# Patient Record
Sex: FEMALE | Race: WHITE | NOT HISPANIC OR LATINO | Employment: FULL TIME | URBAN - METROPOLITAN AREA
[De-identification: names, ages, dates, MRNs, and addresses within clinical notes are randomized per-mention and may not be internally consistent; named-entity substitution may affect disease eponyms.]

---

## 2017-03-06 ENCOUNTER — LAB REQUISITION (OUTPATIENT)
Dept: LAB | Facility: HOSPITAL | Age: 57
End: 2017-03-06
Payer: COMMERCIAL

## 2017-03-06 ENCOUNTER — ALLSCRIPTS OFFICE VISIT (OUTPATIENT)
Dept: OTHER | Facility: OTHER | Age: 57
End: 2017-03-06

## 2017-03-06 DIAGNOSIS — R31.29 OTHER MICROSCOPIC HEMATURIA: ICD-10-CM

## 2017-03-06 LAB
BACTERIA UR QL AUTO: NORMAL /HPF
BILIRUB UR QL STRIP: NEGATIVE
CLARITY UR: CLEAR
COLOR UR: YELLOW
GLUCOSE UR STRIP-MCNC: NEGATIVE MG/DL
HGB UR QL STRIP.AUTO: ABNORMAL
HYALINE CASTS #/AREA URNS LPF: NORMAL /LPF
KETONES UR STRIP-MCNC: NEGATIVE MG/DL
LEUKOCYTE ESTERASE UR QL STRIP: NEGATIVE
NITRITE UR QL STRIP: NEGATIVE
NON-SQ EPI CELLS URNS QL MICRO: NORMAL /HPF
PH UR STRIP.AUTO: 6 [PH] (ref 4.5–8)
PROT UR STRIP-MCNC: NEGATIVE MG/DL
RBC #/AREA URNS AUTO: NORMAL /HPF
SP GR UR STRIP.AUTO: 1.01 (ref 1–1.03)
UROBILINOGEN UR QL STRIP.AUTO: 0.2 E.U./DL
WBC #/AREA URNS AUTO: NORMAL /HPF

## 2017-03-06 PROCEDURE — 81001 URINALYSIS AUTO W/SCOPE: CPT | Performed by: FAMILY MEDICINE

## 2017-03-06 PROCEDURE — 87086 URINE CULTURE/COLONY COUNT: CPT | Performed by: FAMILY MEDICINE

## 2017-03-07 LAB — BACTERIA UR CULT: NORMAL

## 2017-04-01 ENCOUNTER — HOSPITAL ENCOUNTER (OUTPATIENT)
Dept: ULTRASOUND IMAGING | Facility: HOSPITAL | Age: 57
Discharge: HOME | End: 2017-04-01
Payer: COMMERCIAL

## 2017-04-01 DIAGNOSIS — R31.29 OTHER MICROSCOPIC HEMATURIA: ICD-10-CM

## 2017-04-01 PROCEDURE — 76770 US EXAM ABDO BACK WALL COMP: CPT

## 2017-07-17 ENCOUNTER — ALLSCRIPTS OFFICE VISIT (OUTPATIENT)
Dept: OTHER | Facility: OTHER | Age: 57
End: 2017-07-17

## 2018-01-13 VITALS
HEART RATE: 84 BPM | WEIGHT: 159.2 LBS | HEIGHT: 66 IN | DIASTOLIC BLOOD PRESSURE: 98 MMHG | BODY MASS INDEX: 25.58 KG/M2 | RESPIRATION RATE: 16 BRPM | SYSTOLIC BLOOD PRESSURE: 142 MMHG

## 2018-01-14 VITALS
HEIGHT: 65 IN | DIASTOLIC BLOOD PRESSURE: 82 MMHG | TEMPERATURE: 98.3 F | HEART RATE: 80 BPM | SYSTOLIC BLOOD PRESSURE: 140 MMHG | BODY MASS INDEX: 26.66 KG/M2 | RESPIRATION RATE: 16 BRPM | WEIGHT: 160 LBS

## 2018-02-15 ENCOUNTER — OFFICE VISIT (OUTPATIENT)
Dept: FAMILY MEDICINE CLINIC | Facility: CLINIC | Age: 58
End: 2018-02-15
Payer: COMMERCIAL

## 2018-02-15 VITALS
DIASTOLIC BLOOD PRESSURE: 94 MMHG | BODY MASS INDEX: 25.01 KG/M2 | HEART RATE: 92 BPM | HEIGHT: 66 IN | WEIGHT: 155.6 LBS | RESPIRATION RATE: 14 BRPM | TEMPERATURE: 99.8 F | SYSTOLIC BLOOD PRESSURE: 146 MMHG

## 2018-02-15 DIAGNOSIS — K63.5 POLYP OF COLON, UNSPECIFIED PART OF COLON, UNSPECIFIED TYPE: ICD-10-CM

## 2018-02-15 DIAGNOSIS — J02.0 STREP PHARYNGITIS: Primary | ICD-10-CM

## 2018-02-15 DIAGNOSIS — R10.32 LEFT LOWER QUADRANT PAIN: ICD-10-CM

## 2018-02-15 DIAGNOSIS — J02.9 SORE THROAT: ICD-10-CM

## 2018-02-15 DIAGNOSIS — R31.9 HEMATURIA, UNSPECIFIED TYPE: ICD-10-CM

## 2018-02-15 LAB
S PYO AG THROAT QL: POSITIVE
SL AMB  POCT GLUCOSE, UA: ABNORMAL
SL AMB LEUKOCYTE ESTERASE,UA: ABNORMAL
SL AMB POCT BILIRUBIN,UA: ABNORMAL
SL AMB POCT BLOOD,UA: ABNORMAL
SL AMB POCT CLARITY,UA: CLEAR
SL AMB POCT COLOR,UA: YELLOW
SL AMB POCT KETONES,UA: ABNORMAL
SL AMB POCT NITRITE,UA: ABNORMAL
SL AMB POCT PH,UA: 5
SL AMB POCT SPECIFIC GRAVITY,UA: 1.03
SL AMB POCT URINE PROTEIN: ABNORMAL
SL AMB POCT UROBILINOGEN: 0.2

## 2018-02-15 PROCEDURE — 81002 URINALYSIS NONAUTO W/O SCOPE: CPT | Performed by: PHYSICIAN ASSISTANT

## 2018-02-15 PROCEDURE — 87880 STREP A ASSAY W/OPTIC: CPT | Performed by: PHYSICIAN ASSISTANT

## 2018-02-15 PROCEDURE — 99214 OFFICE O/P EST MOD 30 MIN: CPT | Performed by: PHYSICIAN ASSISTANT

## 2018-02-15 RX ORDER — AMOXICILLIN 500 MG/1
500 TABLET, FILM COATED ORAL
Qty: 30 TABLET | Refills: 0 | Status: SHIPPED | OUTPATIENT
Start: 2018-02-15 | End: 2018-02-25

## 2018-04-09 ENCOUNTER — OFFICE VISIT (OUTPATIENT)
Dept: FAMILY MEDICINE CLINIC | Facility: CLINIC | Age: 58
End: 2018-04-09
Payer: COMMERCIAL

## 2018-04-09 VITALS
RESPIRATION RATE: 16 BRPM | TEMPERATURE: 98.3 F | HEIGHT: 66 IN | BODY MASS INDEX: 25.17 KG/M2 | WEIGHT: 156.6 LBS | HEART RATE: 82 BPM | DIASTOLIC BLOOD PRESSURE: 88 MMHG | SYSTOLIC BLOOD PRESSURE: 138 MMHG

## 2018-04-09 DIAGNOSIS — J02.9 SORE THROAT: ICD-10-CM

## 2018-04-09 DIAGNOSIS — J06.9 UPPER RESPIRATORY TRACT INFECTION, UNSPECIFIED TYPE: Primary | ICD-10-CM

## 2018-04-09 LAB — S PYO AG THROAT QL: NEGATIVE

## 2018-04-09 PROCEDURE — 99213 OFFICE O/P EST LOW 20 MIN: CPT | Performed by: FAMILY MEDICINE

## 2018-04-09 PROCEDURE — 87880 STREP A ASSAY W/OPTIC: CPT | Performed by: FAMILY MEDICINE

## 2018-04-09 NOTE — PROGRESS NOTES
Assessment/Plan:  1  Upper respiratory tract infection/ Sore throat  - rest, increase fluids, gargle with salt water  - take Tylenol 500 mg 2 tablets every 4-6 hours as needed  - sodium chloride (OCEAN) 0 65 % nasal spray; 2 sprays into each nostril 2 (two) times a day  Dispense: 15 mL; Refill: 0  - POCT rapid strep A negative  - Throat culture obtained and we will call with results  - f/u in the office if symptoms persist or worsen  Possible side effects of new medications were reviewed with the patient today  The treatment plan was reviewed with the patient  The patient understands and agrees with the treatment plan      Subjective:   Chief Complaint   Patient presents with    Sore Throat      Patient ID: Lind Sicard is a 65 y o  female who presents today with c/o nasal congestion, sore throat and non productive cough x 2 days  Sore Throat    This is a new problem  The current episode started in the past 7 days  There has been no fever  Associated symptoms include congestion and coughing  Pertinent negatives include no abdominal pain, diarrhea, ear discharge, ear pain, headaches, hoarse voice, plugged ear sensation, neck pain, shortness of breath, swollen glands, trouble swallowing or vomiting  She has tried cool liquids and gargles for the symptoms  Past Medical History:   Diagnosis Date    Anxiety     Hyperlipidemia     Hypertension     Microscopic hematuria     Subclinical hypothyroidism      History reviewed  No pertinent surgical history  Family History   Problem Relation Age of Onset    Arthritis Mother     Heart disease Father     Diabetes Son     Mental illness Neg Hx     Substance Abuse Neg Hx      Social History     Social History    Marital status: /Civil Union     Spouse name: N/A    Number of children: N/A    Years of education: N/A     Occupational History    Not on file       Social History Main Topics    Smoking status: Never Smoker    Smokeless tobacco: Never Used    Alcohol use No    Drug use: No    Sexual activity: Not on file     Other Topics Concern    Not on file     Social History Narrative    No narrative on file       Current Outpatient Prescriptions:     sodium chloride (OCEAN) 0 65 % nasal spray, 2 sprays into each nostril 2 (two) times a day, Disp: 15 mL, Rfl: 0    Review of Systems   Constitutional: Negative for chills, fatigue and fever  HENT: Positive for congestion, postnasal drip and sore throat  Negative for ear discharge, ear pain, hoarse voice, sinus pain and trouble swallowing  Respiratory: Positive for cough  Negative for shortness of breath and wheezing  Cardiovascular: Negative for chest pain, palpitations and leg swelling  Gastrointestinal: Negative for abdominal pain, diarrhea, nausea and vomiting  Musculoskeletal: Negative for neck pain  Skin: Negative for rash  Neurological: Negative for dizziness and headaches  Hematological: Negative for adenopathy  Objective:    Vitals:    04/09/18 1102   BP: 138/88   BP Location: Left arm   Patient Position: Sitting   Cuff Size: Adult   Pulse: 82   Resp: 16   Temp: 98 3 °F (36 8 °C)   Weight: 71 kg (156 lb 9 6 oz)   Height: 5' 5 5" (1 664 m)        Physical Exam   Constitutional: She is oriented to person, place, and time  She appears well-developed and well-nourished  No distress  HENT:   Head: Normocephalic and atraumatic  Right Ear: Tympanic membrane and ear canal normal    Left Ear: Tympanic membrane and ear canal normal    Nose: Mucosal edema and rhinorrhea present  Right sinus exhibits no maxillary sinus tenderness and no frontal sinus tenderness  Left sinus exhibits no maxillary sinus tenderness and no frontal sinus tenderness  Mouth/Throat: Uvula is midline  Posterior oropharyngeal erythema present  No oropharyngeal exudate  Neck: Neck supple  Cardiovascular: Normal rate, regular rhythm and normal heart sounds  No murmur heard    Pulmonary/Chest: Effort normal and breath sounds normal  No respiratory distress  She has no wheezes  She has no rhonchi  She has no rales  Abdominal: Soft  Musculoskeletal: She exhibits no edema  Lymphadenopathy:     She has no cervical adenopathy  Neurological: She is alert and oriented to person, place, and time  Psychiatric: She has a normal mood and affect

## 2018-07-26 ENCOUNTER — TELEPHONE (OUTPATIENT)
Dept: FAMILY MEDICINE CLINIC | Facility: CLINIC | Age: 58
End: 2018-07-26

## 2018-07-26 NOTE — TELEPHONE ENCOUNTER
Patient has an appt with a plastic surgeon in [address] [place] [id number] to check on a mole on her nose   Do you want to see the patient first?

## 2018-08-31 ENCOUNTER — OFFICE VISIT (OUTPATIENT)
Dept: FAMILY MEDICINE CLINIC | Facility: CLINIC | Age: 58
End: 2018-08-31
Payer: COMMERCIAL

## 2018-08-31 DIAGNOSIS — I10 ESSENTIAL HYPERTENSION: ICD-10-CM

## 2018-08-31 DIAGNOSIS — R07.9 CHEST PAIN, UNSPECIFIED TYPE: Primary | ICD-10-CM

## 2018-08-31 DIAGNOSIS — E78.5 HYPERLIPIDEMIA, UNSPECIFIED HYPERLIPIDEMIA TYPE: ICD-10-CM

## 2018-08-31 DIAGNOSIS — R07.89 CHEST TIGHTNESS: ICD-10-CM

## 2018-08-31 PROCEDURE — 99213 OFFICE O/P EST LOW 20 MIN: CPT | Performed by: FAMILY MEDICINE

## 2018-08-31 PROCEDURE — 3008F BODY MASS INDEX DOCD: CPT | Performed by: FAMILY MEDICINE

## 2018-08-31 PROCEDURE — 93000 ELECTROCARDIOGRAM COMPLETE: CPT | Performed by: FAMILY MEDICINE

## 2018-08-31 RX ORDER — LISINOPRIL 10 MG/1
10 TABLET ORAL DAILY
Qty: 30 TABLET | Refills: 3 | Status: SHIPPED | OUTPATIENT
Start: 2018-08-31 | End: 2018-10-26 | Stop reason: SINTOL

## 2018-09-03 VITALS
DIASTOLIC BLOOD PRESSURE: 98 MMHG | SYSTOLIC BLOOD PRESSURE: 168 MMHG | HEART RATE: 84 BPM | WEIGHT: 159.6 LBS | RESPIRATION RATE: 16 BRPM | HEIGHT: 66 IN | BODY MASS INDEX: 25.65 KG/M2

## 2018-09-04 NOTE — PROGRESS NOTES
Assessment/Plan:    1  Chest pain  - POCT ECG: NSR, no acute ischemia, no interval change  - will obtain exercise stress test  - Stress test only, exercise; Future    2  Essential hypertension  - start Lisinopril 10 mg daily, will obtain lab work and call with results, discussed low salt diet and regular exercise  - lisinopril (ZESTRIL) 10 mg tablet; Take 1 tablet (10 mg total) by mouth daily  Dispense: 30 tablet; Refill: 3  - Lipid Panel with Direct LDL reflex; Future  - Comprehensive metabolic panel; Future  - CBC and differential; Future  - TSH, 3rd generation with Free T4 reflex; Future  - Urinalysis with reflex to microscopic    Follow up in 1 month or sooner as needed  Possible side effects of new medications were reviewed with the patient today  The treatment plan was reviewed with the patient  The patient understands and agrees with the treatment plan      Subjective:   Chief Complaint   Patient presents with    Hypertension    Chest Pain      Patient ID: Aleshia Higginbotham is a 63 y o  female here today with c/o elevated BP and occasional episodes of chest tightness/ pressure for the past few weeks  Patient denies radiation of pain, associated SOB, palpitations, leg edema, orthopnea, paroxysmal nocturnal dyspnea, fever/chills, cough or mucus production, nausea/vomiting or diarrhea  Patient denies exertional chest pain or SOB, she work as a special , 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 and denies feeling stressed or increased anxiety  Hypertension   This is a new problem  The current episode started 1 to 4 weeks ago  Associated symptoms include chest pain  Pertinent negatives include no anxiety, blurred vision, headaches, neck pain, palpitations, peripheral edema, shortness of breath or sweats  Past treatments include nothing           Past Medical History:   Diagnosis Date    Anxiety     Hyperlipidemia     Hypertension     Microscopic hematuria     Subclinical hypothyroidism      Past Surgical History:   Procedure Laterality Date    CATARACT EXTRACTION Right      SECTION      TONSILLECTOMY      TOTAL ABDOMINAL HYSTERECTOMY  2010    w/left ovary removal  Last assessed: 16     Family History   Problem Relation Age of Onset    Arthritis Mother     Heart disease Father     Diabetes Father     Diabetes type I Son         Mellitus    Mental illness Neg Hx     Substance Abuse Neg Hx      Social History     Social History    Marital status: /Civil Union     Spouse name: N/A    Number of children: N/A    Years of education: N/A     Occupational History    Not on file  Social History Main Topics    Smoking status: Never Smoker    Smokeless tobacco: Never Used    Alcohol use No    Drug use: No    Sexual activity: Not on file     Other Topics Concern    Not on file     Social History Narrative    Always uses seatbelts    Caffeine use: 2 cups coffee/day    Has smoke detectors           Current Outpatient Prescriptions:     lisinopril (ZESTRIL) 10 mg tablet, Take 1 tablet (10 mg total) by mouth daily, Disp: 30 tablet, Rfl: 3    Review of Systems   Constitutional: Negative for chills, fatigue and fever  HENT: Negative for congestion, ear discharge, ear pain, postnasal drip, sinus pain, sore throat and trouble swallowing  Eyes: Negative for blurred vision and visual disturbance  Respiratory: Negative for cough, shortness of breath and wheezing  Cardiovascular: Positive for chest pain  Negative for palpitations and leg swelling  Gastrointestinal: Negative for abdominal pain, diarrhea, nausea and vomiting  Genitourinary: Negative for dysuria, frequency, hematuria and urgency  Musculoskeletal: Negative for arthralgias, myalgias and neck pain  Skin: Negative for rash  Neurological: Negative for dizziness, syncope, weakness, numbness and headaches  Hematological: Negative for adenopathy               Objective:    Vitals:    18 1451 18 1501   BP: (!) 176/102 168/98   BP Location: Left arm Left arm   Patient Position: Sitting Sitting   Cuff Size: Standard Standard   Pulse: 84    Resp: 16    Weight: 72 4 kg (159 lb 9 6 oz)    Height: 5' 5 5" (1 664 m)         Physical Exam   Constitutional: She is oriented to person, place, and time  She appears well-developed and well-nourished  No distress  Neck: Neck supple  No thyromegaly present  Cardiovascular: Normal rate, regular rhythm and normal heart sounds  No murmur heard  Pulmonary/Chest: Effort normal  No respiratory distress  She has no wheezes  She has no rhonchi  She has no rales  She exhibits no tenderness  Abdominal: Soft  She exhibits no distension and no mass  There is no tenderness  Musculoskeletal: She exhibits no edema  Lymphadenopathy:     She has no cervical adenopathy  Neurological: She is alert and oriented to person, place, and time  Psychiatric: She has a normal mood and affect   Her behavior is normal  Thought content normal

## 2018-10-02 ENCOUNTER — OFFICE VISIT (OUTPATIENT)
Dept: FAMILY MEDICINE CLINIC | Facility: CLINIC | Age: 58
End: 2018-10-02
Payer: COMMERCIAL

## 2018-10-02 VITALS
BODY MASS INDEX: 25.23 KG/M2 | RESPIRATION RATE: 16 BRPM | WEIGHT: 157 LBS | HEIGHT: 66 IN | DIASTOLIC BLOOD PRESSURE: 98 MMHG | HEART RATE: 80 BPM | SYSTOLIC BLOOD PRESSURE: 162 MMHG

## 2018-10-02 DIAGNOSIS — I10 ESSENTIAL HYPERTENSION: Primary | ICD-10-CM

## 2018-10-02 DIAGNOSIS — E78.2 HYPERLIPEMIA, MIXED: ICD-10-CM

## 2018-10-02 DIAGNOSIS — M25.571 RIGHT ANKLE PAIN, UNSPECIFIED CHRONICITY: ICD-10-CM

## 2018-10-02 DIAGNOSIS — R79.89 ELEVATED TSH: ICD-10-CM

## 2018-10-02 DIAGNOSIS — R94.39 ABNORMAL CARDIOVASCULAR STRESS TEST: ICD-10-CM

## 2018-10-02 PROCEDURE — 99214 OFFICE O/P EST MOD 30 MIN: CPT | Performed by: FAMILY MEDICINE

## 2018-10-02 NOTE — PATIENT INSTRUCTIONS

## 2018-10-09 ENCOUNTER — TELEPHONE (OUTPATIENT)
Dept: FAMILY MEDICINE CLINIC | Facility: CLINIC | Age: 58
End: 2018-10-09

## 2018-10-26 ENCOUNTER — OFFICE VISIT (OUTPATIENT)
Dept: FAMILY MEDICINE CLINIC | Facility: CLINIC | Age: 58
End: 2018-10-26
Payer: COMMERCIAL

## 2018-10-26 ENCOUNTER — TELEPHONE (OUTPATIENT)
Dept: FAMILY MEDICINE CLINIC | Facility: CLINIC | Age: 58
End: 2018-10-26

## 2018-10-26 VITALS
BODY MASS INDEX: 25.55 KG/M2 | WEIGHT: 159 LBS | SYSTOLIC BLOOD PRESSURE: 140 MMHG | HEIGHT: 66 IN | RESPIRATION RATE: 16 BRPM | TEMPERATURE: 97.9 F | HEART RATE: 76 BPM | DIASTOLIC BLOOD PRESSURE: 92 MMHG

## 2018-10-26 DIAGNOSIS — B37.83 ANGULAR CHEILITIS WITH CANDIDIASIS: ICD-10-CM

## 2018-10-26 DIAGNOSIS — H10.32 ACUTE CONJUNCTIVITIS OF LEFT EYE, UNSPECIFIED ACUTE CONJUNCTIVITIS TYPE: Primary | ICD-10-CM

## 2018-10-26 DIAGNOSIS — J06.9 UPPER RESPIRATORY TRACT INFECTION, UNSPECIFIED TYPE: ICD-10-CM

## 2018-10-26 PROCEDURE — 99213 OFFICE O/P EST LOW 20 MIN: CPT | Performed by: FAMILY MEDICINE

## 2018-10-26 PROCEDURE — 3008F BODY MASS INDEX DOCD: CPT | Performed by: FAMILY MEDICINE

## 2018-10-26 PROCEDURE — 1036F TOBACCO NON-USER: CPT | Performed by: FAMILY MEDICINE

## 2018-10-26 RX ORDER — TOBRAMYCIN 3 MG/ML
2 SOLUTION/ DROPS OPHTHALMIC 3 TIMES DAILY
Qty: 5 ML | Refills: 0 | Status: SHIPPED | OUTPATIENT
Start: 2018-10-26 | End: 2019-01-15 | Stop reason: ALTCHOICE

## 2018-10-26 NOTE — TELEPHONE ENCOUNTER
Patient is passing on the info nereyda needed   The cream she uses for under her lips is clotrimazole betamethasone dipropionate cream  She needs this sent to [facility] [place]

## 2018-10-26 NOTE — PROGRESS NOTES
Assessment/Plan:    1  Acute conjunctivitis of left eye, unspecified acute conjunctivitis type  - start Tobrex eye drops, advised to see an eye doctor if symptoms not better  - tobramycin (TOBREX) 0 3 % SOLN; Administer 2 drops into the left eye 3 (three) times a day  Dispense: 5 mL; Refill: 0    2  Upper respiratory tract infection, unspecified type  - discussed rest, plenty of fluids, use saline nasal rinse and take Mucinex as needed    3  Angular cheilitis with candidiasis  - nystatin (MYCOSTATIN) cream; Apply topically 2 (two) times a day to affected areas  Dispense: 30 g; Refill: 0  - follow up in the office if symptoms persist or worsen  Possible side effects of new medications were reviewed with the patient today  The treatment plan was reviewed with the patient  The patient understands and agrees with the treatment plan      Subjective:   Chief Complaint   Patient presents with    Cold Like Symptoms    Eye Pain     itchy L eye    Dry lips      Patient ID: Leopold Boom is a 63 y o  female here with c/o increased nasal congestion, runny nose for several days, she also developed left eye redness, itching and crusting, no eye pain or photophobia, no FB sensation, no fever or chills, no cough, no chest pain or SOB  Patient is also c/o redness and crusting at the corners of her mouth, she has been using chapstick without significant improvement             Past Medical History:   Diagnosis Date    Anxiety     Hyperlipidemia     Hypertension     Microscopic hematuria     Subclinical hypothyroidism      Past Surgical History:   Procedure Laterality Date    CATARACT EXTRACTION Right      SECTION      TONSILLECTOMY      TOTAL ABDOMINAL HYSTERECTOMY  2010    w/left ovary removal  Last assessed: 16     Family History   Problem Relation Age of Onset    Arthritis Mother     Heart disease Father     Diabetes Father     Diabetes type I Son         Mellitus    Mental illness Neg Hx     Substance Abuse Neg Hx      Social History     Social History    Marital status: /Civil Union     Spouse name: N/A    Number of children: N/A    Years of education: N/A     Occupational History    Not on file  Social History Main Topics    Smoking status: Never Smoker    Smokeless tobacco: Never Used    Alcohol use No    Drug use: No    Sexual activity: Not on file     Other Topics Concern    Not on file     Social History Narrative    Always uses seatbelts    Caffeine use: 2 cups coffee/day    Has smoke detectors           Current Outpatient Prescriptions:     tobramycin (TOBREX) 0 3 % SOLN, Administer 2 drops into the left eye 3 (three) times a day, Disp: 5 mL, Rfl: 0    Review of Systems   Constitutional: Negative for chills, fatigue and fever  HENT: Positive for congestion, postnasal drip and rhinorrhea  Negative for ear pain, sinus pressure, sore throat and trouble swallowing  Eyes: Positive for discharge, redness and itching  Negative for photophobia, pain and visual disturbance  Respiratory: Negative for cough, shortness of breath and wheezing  Cardiovascular: Negative for chest pain, palpitations and leg swelling  Gastrointestinal: Negative for abdominal pain, diarrhea, nausea and vomiting  Objective:    Vitals:    10/26/18 1055   BP: 140/92   BP Location: Left arm   Patient Position: Sitting   Cuff Size: Standard   Pulse: 76   Resp: 16   Temp: 97 9 °F (36 6 °C)   Weight: 72 1 kg (159 lb)   Height: 5' 5 5" (1 664 m)        Physical Exam   Constitutional: She appears well-developed and well-nourished  No distress  HENT:   Right Ear: Tympanic membrane and ear canal normal    Left Ear: Tympanic membrane and ear canal normal    Nose: Mucosal edema present  No rhinorrhea  Mouth/Throat: Oropharynx is clear and moist    Eyes: Pupils are equal, round, and reactive to light  EOM and lids are normal  Right conjunctiva is not injected   Left conjunctiva is injected  Neck: Neck supple  Cardiovascular: Normal rate, regular rhythm and normal heart sounds  No murmur heard  Pulmonary/Chest: Effort normal and breath sounds normal  She has no wheezes  She has no rales  Lymphadenopathy:     She has no cervical adenopathy     Skin:   Erythema just below lower lip and corners of mouth

## 2018-10-26 NOTE — TELEPHONE ENCOUNTER
Please call patient, I do not recommend to use any steroid cream ( betamethasone) on her face  I can prescribe antifungal cream - Nystatin  If patient agrees, I will send Nystatin script to her pharmacy

## 2018-10-27 RX ORDER — NYSTATIN 100000 U/G
CREAM TOPICAL 2 TIMES DAILY
Qty: 30 G | Refills: 0 | Status: SHIPPED | OUTPATIENT
Start: 2018-10-27 | End: 2019-01-15 | Stop reason: ALTCHOICE

## 2018-10-27 NOTE — TELEPHONE ENCOUNTER
I informed patient that she should not use the steroid cream on her face  Yes, she would like you to prescribe Nystatin to her [facility]

## 2018-12-16 ENCOUNTER — OFFICE VISIT (OUTPATIENT)
Dept: URGENT CARE | Facility: CLINIC | Age: 58
End: 2018-12-16
Payer: COMMERCIAL

## 2018-12-16 DIAGNOSIS — J02.9 SORE THROAT: ICD-10-CM

## 2018-12-16 DIAGNOSIS — R09.82 POST-NASAL DRIP: Primary | ICD-10-CM

## 2018-12-16 LAB — S PYO AG THROAT QL: NEGATIVE

## 2018-12-16 PROCEDURE — 99213 OFFICE O/P EST LOW 20 MIN: CPT | Performed by: NURSE PRACTITIONER

## 2018-12-16 PROCEDURE — 87430 STREP A AG IA: CPT | Performed by: NURSE PRACTITIONER

## 2018-12-16 NOTE — PATIENT INSTRUCTIONS
Follow up with pcp   Take meds as directed  Use mucinex for the symptoms   Take zyrtec or allegra for symptoms daily   Use flonase as directed  Warm salt gargles  No product with a D or Dm component due to BP  Discussed elevated BP and to follow up with pcp     Warm fluids   Rest

## 2018-12-16 NOTE — PROGRESS NOTES
NAME: Muna Saleem is a 63 y o  female  : 1960    MRN: [id number]      Assessment and Plan   Post-nasal drip [R09 82]  1  Post-nasal drip  POCT rapid strepA   2  Sore throat  POCT rapid strepA    Throat culture       Bert Hammond was seen today for sore throat  Diagnoses and all orders for this visit:    Post-nasal drip  -     POCT rapid strepA    Sore throat  -     POCT rapid strepA  -     Throat culture    rapid strep was negative today on exam, sent for culture    Patient Instructions   Patient Instructions   Follow up with pcp   Take meds as directed  Use mucinex for the symptoms   Take zyrtec or allegra for symptoms daily   Use flonase as directed  Warm salt gargles  No product with a D or Dm component due to BP  Discussed elevated BP and to follow up with pcp     Warm fluids   Rest     Proceed to ER if symptoms worsen  Chief Complaint     Chief Complaint   Patient presents with    Sore Throat     swallen tonsils and sore throat past 3 weeks         History of Present Illness     Pt here today for sore throat, swollen glands, states leaving this weekend to [facility] and wants to be better before she leaves  She has had the symptoms for the past three weeks and then went away and then came back  Rapid strept done today and negative  She had the flu shot in Oct 2018  She has pain on the left side of the neck  She has not taken anything or doing anything for the symptoms  She has no pain with ROM of the neck and no palpable swelling noted  She also has elevated BP today and is aware that it is elevated at times  She was started on lisinopril in October and then took herself off  Her pcp is aware and her BP at home is never this high  She has no CP or SOB noted today  She has no JVD and has no deformity of her neck noted  NO difficulty swallowing or breathing present  Denies taking OTC meds at this time  Aware that her BP is still elevated and needs to see pcp about it   Pain or symptoms worsen, pt to go to the ER        Sore Throat    Pertinent negatives include no coughing or headaches  Review of Systems   Review of Systems   Constitutional: Negative for fatigue and fever  HENT: Positive for sore throat  Respiratory: Negative for cough and chest tightness  Genitourinary: Negative  Neurological: Negative for dizziness, syncope, weakness, light-headedness and headaches  Current Medications       Current Outpatient Prescriptions:     nystatin (MYCOSTATIN) cream, Apply topically 2 (two) times a day to affected areas (Patient not taking: Reported on 2018 ), Disp: 30 g, Rfl: 0    tobramycin (TOBREX) 0 3 % SOLN, Administer 2 drops into the left eye 3 (three) times a day (Patient not taking: Reported on 2018 ), Disp: 5 mL, Rfl: 0    Current Allergies     Allergies as of 2018 - Reviewed 2018   Allergen Reaction Noted    Zithromax [azithromycin]  2012    Codeine Drowsiness 2017              Past Medical History:   Diagnosis Date    Anxiety     Hyperlipidemia     Hypertension     Microscopic hematuria     Subclinical hypothyroidism        Past Surgical History:   Procedure Laterality Date    CATARACT EXTRACTION Right      SECTION      TONSILLECTOMY      TOTAL ABDOMINAL HYSTERECTOMY  2010    w/left ovary removal  Last assessed: 16       Family History   Problem Relation Age of Onset    Arthritis Mother     Heart disease Father     Diabetes Father     Diabetes type I Son         Mellitus    Mental illness Neg Hx     Substance Abuse Neg Hx          Medications have been verified      The following portions of the patient's history were reviewed and updated as appropriate: allergies, current medications, past family history, past medical history, past social history, past surgical history and problem list     Objective   BP (!) 196/102   Pulse 97   Temp (!) 96 3 °F (35 7 °C)   Ht 5' 4" (1 626 m)   Wt 71 7 kg (158 lb) SpO2 99%   BMI 27 12 kg/m²   Recheck 174/92     Physical Exam     Physical Exam   Constitutional: She appears well-developed  She is cooperative  Aware of elevated BP, rechecked BP and was 174/94   HENT:   Head: Normocephalic  Right Ear: Hearing, tympanic membrane, external ear and ear canal normal    Left Ear: Hearing, tympanic membrane, external ear and ear canal normal    Nose: Nose normal    Mouth/Throat: Uvula is midline and oropharynx is clear and moist    Back of throat is slight red, no white exudate noted, tonsils small in appearance   Neck: Trachea normal and normal range of motion  Neck supple  No JVD present  Muscular tenderness (left side) present  Carotid bruit is not present  No edema present  No thyroid mass present  Tender on the left side, no mass or enlarge lymphnodes noted  Cardiovascular: Normal rate and regular rhythm  Pulmonary/Chest: Effort normal and breath sounds normal  She has no decreased breath sounds  Neurological: She is alert  Skin: Skin is warm and intact         MATTY Martinez

## 2018-12-17 VITALS
HEIGHT: 64 IN | TEMPERATURE: 96.3 F | WEIGHT: 158 LBS | HEART RATE: 97 BPM | DIASTOLIC BLOOD PRESSURE: 102 MMHG | SYSTOLIC BLOOD PRESSURE: 196 MMHG | BODY MASS INDEX: 26.98 KG/M2 | OXYGEN SATURATION: 99 %

## 2019-01-15 ENCOUNTER — OFFICE VISIT (OUTPATIENT)
Dept: FAMILY MEDICINE CLINIC | Facility: CLINIC | Age: 59
End: 2019-01-15
Payer: COMMERCIAL

## 2019-01-15 VITALS
BODY MASS INDEX: 26.29 KG/M2 | HEIGHT: 65 IN | WEIGHT: 157.8 LBS | DIASTOLIC BLOOD PRESSURE: 108 MMHG | RESPIRATION RATE: 16 BRPM | SYSTOLIC BLOOD PRESSURE: 172 MMHG | HEART RATE: 84 BPM

## 2019-01-15 DIAGNOSIS — I10 ESSENTIAL HYPERTENSION: Primary | ICD-10-CM

## 2019-01-15 DIAGNOSIS — E78.5 HYPERLIPIDEMIA, UNSPECIFIED HYPERLIPIDEMIA TYPE: ICD-10-CM

## 2019-01-15 DIAGNOSIS — R79.89 ELEVATED TSH: ICD-10-CM

## 2019-01-15 PROCEDURE — 99213 OFFICE O/P EST LOW 20 MIN: CPT | Performed by: FAMILY MEDICINE

## 2019-01-15 PROCEDURE — 3008F BODY MASS INDEX DOCD: CPT | Performed by: FAMILY MEDICINE

## 2019-01-15 PROCEDURE — 1036F TOBACCO NON-USER: CPT | Performed by: FAMILY MEDICINE

## 2019-01-16 NOTE — PROGRESS NOTES
Assessment/Plan:  1  Essential hypertension  - patient declines BP lowering medications, discussed low sodiim diet, call the office in 2 weeks with home BP readings  - TSH, 3rd generation with Free T4 reflex; Future    2  Hyperlipidemia, unspecified hyperlipidemia type  - discussed low fat/ low cholesterol diet and regular exercise  - Lipid Panel with Direct LDL reflex; Future    3  Elevated TSH  - s/s of hypothyroidism discussed, will recheck TSH  - TSH, 3rd generation with Free T4 reflex; Future    Follow up in 3 months or sooner as needed  The treatment plan was reviewed with the patient  The patient understands and agrees with the treatment plan      Subjective:   Chief Complaint   Patient presents with    Hypertension    Hyperlipidemia    Elevated TSH      Patient ID: Imer Lerma is a 63 y o  female who presents for recheck on her HTN, patient states she feels well and has no complaints, her home BP readings have been ranging systolic in 670'H and diastolic in 36-21  Patient states her diet since the holidays has been unhealthy and she has been eating a lot of canned soups lately  Hypertension   The problem is uncontrolled  Pertinent negatives include no blurred vision, chest pain, headaches, malaise/fatigue, orthopnea, palpitations, peripheral edema, PND or shortness of breath  Past treatments include lifestyle changes         The following portions of the patient's history were reviewed and updated as appropriate: allergies, current medications, past family history, past medical history, past social history, past surgical history and problem list     Past Medical History:   Diagnosis Date    Anxiety     Hyperlipidemia     Hypertension     Microscopic hematuria     Subclinical hypothyroidism      Past Surgical History:   Procedure Laterality Date    CATARACT EXTRACTION Right      SECTION      TONSILLECTOMY      TOTAL ABDOMINAL HYSTERECTOMY  2010    w/left ovary removal  Last assessed: 5/24/16     Family History   Problem Relation Age of Onset    Arthritis Mother     Heart disease Father     Diabetes Father     Diabetes type I Son         Mellitus    Mental illness Neg Hx     Substance Abuse Neg Hx      Social History     Social History    Marital status: /Civil Union     Spouse name: N/A    Number of children: N/A    Years of education: N/A     Occupational History    Not on file  Social History Main Topics    Smoking status: Never Smoker    Smokeless tobacco: Never Used    Alcohol use No    Drug use: No    Sexual activity: Not on file     Other Topics Concern    Not on file     Social History Narrative    Always uses seatbelts    Caffeine use: 2 cups coffee/day    Has smoke detectors         No current outpatient prescriptions on file  Review of Systems   Constitutional: Negative for chills, fatigue, fever and malaise/fatigue  Eyes: Negative for blurred vision and visual disturbance  Respiratory: Negative for cough, shortness of breath and wheezing  Cardiovascular: Negative for chest pain, palpitations, orthopnea, leg swelling and PND  Gastrointestinal: Negative for abdominal pain, diarrhea, nausea and vomiting  Genitourinary: Negative for dysuria, frequency, hematuria and urgency  Neurological: Negative for dizziness, syncope, weakness, numbness and headaches  Hematological: Negative for adenopathy  Psychiatric/Behavioral: Negative for dysphoric mood and sleep disturbance  The patient is not nervous/anxious  Objective:    Vitals:    01/15/19 1808 01/15/19 1823   BP: (!) 178/118 (!) 172/108   BP Location: Left arm Left arm   Patient Position: Sitting Sitting   Cuff Size: Standard Standard   Pulse: 84    Resp: 16    Weight: 71 6 kg (157 lb 12 8 oz)    Height: 5' 5" (1 651 m)         Physical Exam   Constitutional: She is oriented to person, place, and time  She appears well-developed and well-nourished  No distress  Neck: Neck supple  No thyromegaly present  Cardiovascular: Normal rate, regular rhythm and normal heart sounds  No murmur heard  Pulmonary/Chest: Effort normal  No respiratory distress  She has no wheezes  She has no rhonchi  She has no rales  She exhibits no tenderness  Abdominal: Soft  She exhibits no distension and no mass  There is no tenderness  Musculoskeletal: She exhibits no edema  Lymphadenopathy:     She has no cervical adenopathy  Neurological: She is alert and oriented to person, place, and time  Psychiatric: She has a normal mood and affect   Her behavior is normal  Thought content normal

## 2019-10-21 ENCOUNTER — OFFICE VISIT (OUTPATIENT)
Dept: FAMILY MEDICINE CLINIC | Facility: CLINIC | Age: 59
End: 2019-10-21
Payer: COMMERCIAL

## 2019-10-21 VITALS
WEIGHT: 154.6 LBS | TEMPERATURE: 98.5 F | BODY MASS INDEX: 24.85 KG/M2 | RESPIRATION RATE: 16 BRPM | HEART RATE: 80 BPM | SYSTOLIC BLOOD PRESSURE: 162 MMHG | HEIGHT: 66 IN | DIASTOLIC BLOOD PRESSURE: 100 MMHG

## 2019-10-21 DIAGNOSIS — I10 ESSENTIAL HYPERTENSION: ICD-10-CM

## 2019-10-21 DIAGNOSIS — H10.33 ACUTE CONJUNCTIVITIS OF BOTH EYES, UNSPECIFIED ACUTE CONJUNCTIVITIS TYPE: ICD-10-CM

## 2019-10-21 DIAGNOSIS — R05.9 COUGH: ICD-10-CM

## 2019-10-21 DIAGNOSIS — J01.90 ACUTE NON-RECURRENT SINUSITIS, UNSPECIFIED LOCATION: Primary | ICD-10-CM

## 2019-10-21 PROCEDURE — 3008F BODY MASS INDEX DOCD: CPT | Performed by: FAMILY MEDICINE

## 2019-10-21 PROCEDURE — 99214 OFFICE O/P EST MOD 30 MIN: CPT | Performed by: FAMILY MEDICINE

## 2019-10-21 PROCEDURE — 93000 ELECTROCARDIOGRAM COMPLETE: CPT | Performed by: FAMILY MEDICINE

## 2019-10-21 RX ORDER — AMLODIPINE BESYLATE 2.5 MG/1
2.5 TABLET ORAL DAILY
Qty: 30 TABLET | Refills: 1 | Status: SHIPPED | OUTPATIENT
Start: 2019-10-21 | End: 2019-11-12

## 2019-10-21 RX ORDER — TOBRAMYCIN 3 MG/ML
2 SOLUTION/ DROPS OPHTHALMIC 3 TIMES DAILY
Qty: 5 ML | Refills: 0 | Status: SHIPPED | OUTPATIENT
Start: 2019-10-21 | End: 2019-11-12 | Stop reason: ALTCHOICE

## 2019-10-21 RX ORDER — AMOXICILLIN 500 MG/1
500 TABLET, FILM COATED ORAL
Qty: 30 TABLET | Refills: 0 | Status: SHIPPED | OUTPATIENT
Start: 2019-10-21 | End: 2019-10-31

## 2019-10-21 RX ORDER — PREDNISONE 10 MG/1
TABLET ORAL
Qty: 20 TABLET | Refills: 0 | Status: SHIPPED | OUTPATIENT
Start: 2019-10-21 | End: 2019-11-12 | Stop reason: ALTCHOICE

## 2019-10-21 NOTE — PROGRESS NOTES
Assessment/Plan:    1  Acute sinusitis  - start Amoxicillin and Prednisone taper, use saline nasal spray  - discussed voice rest and plenty of fluids  - follow up if not better  - amoxicillin (AMOXIL) 500 MG tablet; Take 1 tablet (500 mg total) by mouth 3 (three) times a day with meals for 10 days  Dispense: 30 tablet; Refill: 0  - predniSONE 10 mg tablet; Take 4 tablets daily with food for 2 days, 3 tablets daily for 2 days, 2 tablets daily for 2 days, 1 tablet daily for 2 days  Dispense: 20 tablet; Refill: 0    2  Acute conjunctivitis of both eyes  - start Tobrex eye drops, discussed eye hygiene  - follow up with your eye doctor if not better  - tobramycin (TOBREX) 0 3 % SOLN; Administer 2 drops to both eyes 3 (three) times a day  Dispense: 5 mL; Refill: 0    3  Essential hypertension  - start Amlodipine 2 5 mg daily  - reviewed low sodium diet and regular exercise  - POCT ECG  - amLODIPine (NORVASC) 2 5 mg tablet; Take 1 tablet (2 5 mg total) by mouth daily  Dispense: 30 tablet; Refill: 1    Follow up in 2-3 weeks or sooner as needed  Possible side effects of new medications were reviewed with the patient today  The treatment plan was reviewed with the patient  The patient understands and agrees with the treatment plan        Subjective:   Chief Complaint   Patient presents with    Sore Throat    Cough    Hypertension      Patient ID: Jagjit Rubalcava is a 63 y o  female here today with c/o nasal congestion, runny nose, cough and postnasal drip for over 3 weeks, she went to Urgent care center on 09/25/19m had a rapid strep test done which was negative, she was advised viral illness and take OTC cold medications as needed  Patient reports no significant improvement of her symptoms, she is now having eye discharge and crusting, yellow  Green rhinorrhea and losing her voice with prolonged talking at work  No fever or chills, no chest pain, no SOB         The following portions of the patient's history were reviewed and updated as appropriate: allergies, current medications, past family history, past medical history, past social history, past surgical history and problem list     Past Medical History:   Diagnosis Date    Anxiety     Hyperlipidemia     Hypertension     Microscopic hematuria     Subclinical hypothyroidism      Past Surgical History:   Procedure Laterality Date    CATARACT EXTRACTION Right      SECTION      TONSILLECTOMY  1980    TOTAL ABDOMINAL HYSTERECTOMY  2010    w/left ovary removal  Last assessed: 16     Family History   Problem Relation Age of Onset    Arthritis Mother     Heart disease Father     Diabetes Father     Diabetes type I Son         Mellitus    Mental illness Neg Hx     Substance Abuse Neg Hx      Social History     Socioeconomic History    Marital status: /Civil Union     Spouse name: Not on file    Number of children: Not on file    Years of education: Not on file    Highest education level: Not on file   Occupational History    Not on file   Social Needs    Financial resource strain: Not on file    Food insecurity:     Worry: Not on file     Inability: Not on file    Transportation needs:     Medical: Not on file     Non-medical: Not on file   Tobacco Use    Smoking status: Never Smoker    Smokeless tobacco: Never Used   Substance and Sexual Activity    Alcohol use: No    Drug use: No    Sexual activity: Not on file   Lifestyle    Physical activity:     Days per week: Not on file     Minutes per session: Not on file    Stress: Not on file   Relationships    Social connections:     Talks on phone: Not on file     Gets together: Not on file     Attends Evangelical service: Not on file     Active member of club or organization: Not on file     Attends meetings of clubs or organizations: Not on file     Relationship status: Not on file    Intimate partner violence:     Fear of current or ex partner: Not on file     Emotionally abused: Not on file     Physically abused: Not on file     Forced sexual activity: Not on file   Other Topics Concern    Not on file   Social History Narrative    Always uses seatbelts    Caffeine use: 2 cups coffee/day    Has smoke detectors       Current Outpatient Medications:     amLODIPine (NORVASC) 2 5 mg tablet, Take 1 tablet (2 5 mg total) by mouth daily, Disp: 30 tablet, Rfl: 1    amoxicillin (AMOXIL) 500 MG tablet, Take 1 tablet (500 mg total) by mouth 3 (three) times a day with meals for 10 days, Disp: 30 tablet, Rfl: 0    predniSONE 10 mg tablet, Take 4 tablets daily with food for 2 days, 3 tablets daily for 2 days, 2 tablets daily for 2 days, 1 tablet daily for 2 days, Disp: 20 tablet, Rfl: 0    tobramycin (TOBREX) 0 3 % SOLN, Administer 2 drops to both eyes 3 (three) times a day, Disp: 5 mL, Rfl: 0    Review of Systems   Constitutional: Positive for fatigue  Negative for chills and fever  HENT: Positive for congestion, postnasal drip, rhinorrhea, sinus pressure and voice change  Negative for ear pain, sore throat and trouble swallowing  Eyes: Positive for discharge, redness and itching  Negative for photophobia and visual disturbance  Respiratory: Positive for cough  Negative for shortness of breath and wheezing  Cardiovascular: Negative for chest pain and palpitations  Gastrointestinal: Negative for abdominal pain, diarrhea, nausea and vomiting  Genitourinary: Negative for dysuria and urgency  Neurological: Negative for dizziness and headaches  Hematological: Negative for adenopathy           Objective:    Vitals:    10/21/19 0806   BP: 162/100   BP Location: Left arm   Patient Position: Sitting   Cuff Size: Standard   Pulse: 80   Resp: 16   Temp: 98 5 °F (36 9 °C)   Weight: 70 1 kg (154 lb 9 6 oz)   Height: 5' 5 5" (1 664 m)     BP Readings from Last 3 Encounters:   10/21/19 162/100   01/15/19 (!) 172/108   12/16/18 (!) 196/102      Physical Exam   Constitutional: She is oriented to person, place, and time  She appears well-developed and well-nourished  No distress  HENT:   Head: Normocephalic and atraumatic  Right Ear: Tympanic membrane and ear canal normal    Left Ear: Tympanic membrane and ear canal normal    Nose: Mucosal edema present  Mouth/Throat: No oropharyngeal exudate or posterior oropharyngeal erythema  Eyes: Pupils are equal, round, and reactive to light  EOM are normal  Right conjunctiva is injected  Left conjunctiva is injected  Neck: Neck supple  Cardiovascular: Normal rate, regular rhythm and normal heart sounds  No murmur heard  Pulmonary/Chest: Effort normal and breath sounds normal  No respiratory distress  She has no wheezes  She has no rhonchi  She has no rales  Lymphadenopathy:     She has no cervical adenopathy  Neurological: She is alert and oriented to person, place, and time  BMI Counseling: Body mass index is 25 34 kg/m²  The BMI is above normal  Nutrition recommendations include consuming healthier snacks, moderation in carbohydrate intake and reducing intake of saturated fat and trans fat  Exercise recommendations include exercising 3-5 times per week

## 2019-11-12 ENCOUNTER — OFFICE VISIT (OUTPATIENT)
Dept: FAMILY MEDICINE CLINIC | Facility: CLINIC | Age: 59
End: 2019-11-12
Payer: COMMERCIAL

## 2019-11-12 VITALS
BODY MASS INDEX: 24.78 KG/M2 | HEART RATE: 80 BPM | RESPIRATION RATE: 16 BRPM | DIASTOLIC BLOOD PRESSURE: 88 MMHG | SYSTOLIC BLOOD PRESSURE: 142 MMHG | HEIGHT: 66 IN | WEIGHT: 154.2 LBS

## 2019-11-12 DIAGNOSIS — Z11.59 NEED FOR HEPATITIS C SCREENING TEST: ICD-10-CM

## 2019-11-12 DIAGNOSIS — I10 ESSENTIAL HYPERTENSION: Primary | ICD-10-CM

## 2019-11-12 DIAGNOSIS — Z23 NEED FOR PROPHYLACTIC VACCINATION AND INOCULATION AGAINST INFLUENZA: ICD-10-CM

## 2019-11-12 DIAGNOSIS — R79.89 ELEVATED TSH: ICD-10-CM

## 2019-11-12 DIAGNOSIS — E78.5 HYPERLIPIDEMIA, UNSPECIFIED HYPERLIPIDEMIA TYPE: ICD-10-CM

## 2019-11-12 PROCEDURE — 99213 OFFICE O/P EST LOW 20 MIN: CPT | Performed by: FAMILY MEDICINE

## 2019-11-12 PROCEDURE — 90682 RIV4 VACC RECOMBINANT DNA IM: CPT

## 2019-11-12 PROCEDURE — 90471 IMMUNIZATION ADMIN: CPT

## 2019-11-12 PROCEDURE — 1036F TOBACCO NON-USER: CPT | Performed by: FAMILY MEDICINE

## 2019-11-12 NOTE — PROGRESS NOTES
Assessment/Plan:  1  Essential hypertension  - patient declines trial of another BP lowering medication, discussed low sodiim diet and regular exercise, advised patient to continue home BP monitoring and bring BP log to the next visit  - Comprehensive metabolic panel; Future  - CBC and differential; Future  - UA (URINE) with reflex to Scope; Future    2  Hyperlipidemia, unspecified hyperlipidemia type  - discussed low fat/ low cholesterol diet and regular exercise  - Lipid Panel with Direct LDL reflex; Future    3  Elevated TSH  - s/s of hypothyroidism discussed, will recheck TSH  - TSH, 3rd generation with Free T4 reflex; Future    4  Need for prophylactic vaccination and inoculation against influenza  - influenza vaccine, [id number], quadrivalent, recombinant, PF, 0 5 mL, for patients 18 yr+ (FLUBLOK)    Follow up in 3 months with labs done prior  The treatment plan was reviewed with the patient  The patient understands and agrees with the treatment plan        Subjective:   Chief Complaint   Patient presents with    Hypertension      Patient ID: Gris Dixon is a 63 y o  female here for follow up visit for hypertension  She was prescribed Amlodipine 2 5 mg daily at her last visit which she took for a few days and developed lightheadedness and stopped taking it  Patient has been working on [employer] and start walking 3-4 days a week  Patient has no other complaints and reports home home BP readings in 130's- low 468'H systolic and in 79'Q diastolic  Patient denies chest pain, SOB, palpitations, leg edema, blurry vision, headache or further episodes of dizziness         The following portions of the patient's history were reviewed and updated as appropriate: allergies, current medications, past family history, past medical history, past social history, past surgical history and problem list     Past Medical History:   Diagnosis Date    Anxiety     Hyperlipidemia     Hypertension     Microscopic hematuria     Subclinical hypothyroidism      Past Surgical History:   Procedure Laterality Date    CATARACT EXTRACTION Right      SECTION  1991    TONSILLECTOMY  1980    TOTAL ABDOMINAL HYSTERECTOMY  2010    w/left ovary removal  Last assessed: 16     Family History   Problem Relation Age of Onset    Arthritis Mother     Heart disease Father     Diabetes Father     Diabetes type I Son         Mellitus    Mental illness Neg Hx     Substance Abuse Neg Hx      Social History     Socioeconomic History    Marital status: /Civil Union     Spouse name: Not on file    Number of children: Not on file    Years of education: Not on file    Highest education level: Not on file   Occupational History    Not on file   Social Needs    Financial resource strain: Not on file    Food insecurity:     Worry: Not on file     Inability: Not on file    Transportation needs:     Medical: Not on file     Non-medical: Not on file   Tobacco Use    Smoking status: Never Smoker    Smokeless tobacco: Never Used   Substance and Sexual Activity    Alcohol use: No    Drug use: No    Sexual activity: Not on file   Lifestyle    Physical activity:     Days per week: Not on file     Minutes per session: Not on file    Stress: Not on file   Relationships    Social connections:     Talks on phone: Not on file     Gets together: Not on file     Attends Mormon service: Not on file     Active member of club or organization: Not on file     Attends meetings of clubs or organizations: Not on file     Relationship status: Not on file    Intimate partner violence:     Fear of current or ex partner: Not on file     Emotionally abused: Not on file     Physically abused: Not on file     Forced sexual activity: Not on file   Other Topics Concern    Not on file   Social History Narrative    Always uses seatbelts    Caffeine use: 2 cups coffee/day    Has smoke detectors     No current outpatient medications on file  Review of Systems   Constitutional: Negative for chills, fatigue and fever  Eyes: Negative for visual disturbance  Respiratory: Negative for cough, shortness of breath and wheezing  Cardiovascular: Negative for chest pain, palpitations and leg swelling  Gastrointestinal: Negative for abdominal pain, diarrhea, nausea and vomiting  Genitourinary: Negative for dysuria, frequency, hematuria and urgency  Neurological: Negative for dizziness, syncope, weakness, numbness and headaches  Hematological: Negative for adenopathy  Psychiatric/Behavioral: Negative for dysphoric mood and sleep disturbance  The patient is not nervous/anxious  Objective:    Vitals:    11/12/19 1612   BP: 142/88   BP Location: Left arm   Patient Position: Sitting   Cuff Size: Standard   Pulse: 80   Resp: 16   Weight: 69 9 kg (154 lb 3 2 oz)   Height: 5' 5 5" (1 664 m)     BP Readings from Last 3 Encounters:   11/12/19 142/88   10/21/19 162/100   01/15/19 (!) 172/108     Wt Readings from Last 3 Encounters:   11/12/19 69 9 kg (154 lb 3 2 oz)   10/21/19 70 1 kg (154 lb 9 6 oz)   01/15/19 71 6 kg (157 lb 12 8 oz)      Physical Exam   Constitutional: She is oriented to person, place, and time  She appears well-developed and well-nourished  No distress  Neck: Neck supple  No thyromegaly present  Cardiovascular: Normal rate, regular rhythm and normal heart sounds  No murmur heard  Pulmonary/Chest: Effort normal  No respiratory distress  She has no wheezes  She has no rhonchi  She has no rales  She exhibits no tenderness  Abdominal: Soft  She exhibits no distension and no mass  There is no tenderness  Musculoskeletal: She exhibits no edema  Lymphadenopathy:     She has no cervical adenopathy  Neurological: She is alert and oriented to person, place, and time  Psychiatric: She has a normal mood and affect   Her behavior is normal  Thought content normal

## 2020-03-24 ENCOUNTER — TELEMEDICINE (OUTPATIENT)
Dept: FAMILY MEDICINE CLINIC | Facility: CLINIC | Age: 60
End: 2020-03-24
Payer: COMMERCIAL

## 2020-03-24 VITALS — SYSTOLIC BLOOD PRESSURE: 145 MMHG | DIASTOLIC BLOOD PRESSURE: 95 MMHG

## 2020-03-24 DIAGNOSIS — I10 ESSENTIAL HYPERTENSION: Primary | ICD-10-CM

## 2020-03-24 DIAGNOSIS — E03.9 ACQUIRED HYPOTHYROIDISM: ICD-10-CM

## 2020-03-24 DIAGNOSIS — E78.5 HYPERLIPIDEMIA, UNSPECIFIED HYPERLIPIDEMIA TYPE: ICD-10-CM

## 2020-03-24 PROCEDURE — 99213 OFFICE O/P EST LOW 20 MIN: CPT | Performed by: FAMILY MEDICINE

## 2020-03-24 RX ORDER — LEVOTHYROXINE SODIUM 0.03 MG/1
25 TABLET ORAL
Qty: 30 TABLET | Refills: 3 | Status: SHIPPED | OUTPATIENT
Start: 2020-03-24 | End: 2020-07-29 | Stop reason: SDUPTHER

## 2020-03-24 NOTE — PROGRESS NOTES
Virtual Regular Visit    Problem List Items Addressed This Visit     None      Visit Diagnoses     Essential hypertension    -  Primary    Hyperlipidemia, unspecified hyperlipidemia type        Acquired hypothyroidism        Relevant Medications    levothyroxine 25 mcg tablet        Reason for visit is for follow up hypertension, hyperlipidemia, elevated TSH and review lab work  Encounter provider Erin Allen MD    Provider located at [address] [place] [address] [address] [place] [address]  [phone]      Recent Visits  No visits were found meeting these conditions  Showing recent visits within past 7 days and meeting all other requirements     Future Appointments  No visits were found meeting these conditions  Showing future appointments within next 150 days and meeting all other requirements        After connecting through [employer], the patient was identified by name and date of birth  Saúl Cali was informed that this is a telemedicine visit and that the visit is being conducted through telephone due to technical difficulties which may not be secure and therefore, might not be HIPAA-compliant  My office door was closed  No one else was in the room  She acknowledged consent and understanding of privacy and security of the video platform  The patient has agreed to participate and understands they can discontinue the visit at any time  Subjective  Saúl Cali is a 63 y o  female reports feeling tired, no other complaints, no recent illnesses, no fever or chills, no recent travel  Patient reports home BP readings systolic ranging 913-082'H, occasional in low 611'V and diastolic in 80, occasionally in low 90's  Her BP this am was 148/92 and just now 135/80  Patient has been working on healthy low sodium diet, but has not been exercising regularly         Past Medical History:   Diagnosis Date    Anxiety  Hyperlipidemia     Hypertension     Microscopic hematuria     Subclinical hypothyroidism        Past Surgical History:   Procedure Laterality Date    CATARACT EXTRACTION Right      SECTION      TONSILLECTOMY      TOTAL ABDOMINAL HYSTERECTOMY  2010    w/left ovary removal  Last assessed: 16       Current Outpatient Medications   Medication Sig Dispense Refill    levothyroxine 25 mcg tablet Take 1 tablet (25 mcg total) by mouth daily before breakfast 30 tablet 3     No current facility-administered medications for this visit  Allergies   Allergen Reactions    Zithromax [Azithromycin]      Other reaction(s): GI Upset  Category: Allergy;     Clindamycin Dizziness and Fatigue    Codeine Drowsiness       Review of Systems   Constitutional: Positive for fatigue  Negative for activity change, appetite change, chills, fever and unexpected weight change  HENT: Negative for congestion, rhinorrhea and sore throat  Eyes: Negative for visual disturbance  Respiratory: Negative for cough, shortness of breath and wheezing  Cardiovascular: Negative for chest pain, palpitations and leg swelling  Gastrointestinal: Negative for abdominal pain, diarrhea, nausea and vomiting  Genitourinary: Negative for dysuria, frequency, hematuria and urgency  Neurological: Negative for dizziness, syncope, weakness, numbness and headaches  Hematological: Negative for adenopathy  Psychiatric/Behavioral: Negative for dysphoric mood and sleep disturbance  The patient is not nervous/anxious  Physical Exam   Constitutional: She is oriented to person, place, and time  No distress  Neurological: She is alert and oriented to person, place, and time  Psychiatric: She has a normal mood and affect  Lab results 2020:    TSH 8 8 up from 5 59 on 2018  Free T4 low at 0 9, was 0 94 on 2018   T Chol 265    HDL 49      Assessment/ plan:    1   Essential hypertension  - patient declines medications, reviewed low sodiim diet and regular exercise, advised to continue home BP monitoring and bring BP log to the next visit    2  Hyperlipidemia  - patient declines cholesterol lowering medications  - discussed low fat/ low cholesterol diet and regular exercise    3  Acquired hypothyroidism  - advised to start Levothyroxine, will recheck TFT's in 2-3 months  - levothyroxine 25 mcg tablet; Take 1 tablet (25 mcg total) by mouth daily before breakfast  Dispense: 30 tablet; Refill: 3  - TSH, 3rd generation with Free T4 reflex; Future    Follow up in the office in 3 months or sooner as needed  Patient understands and agrees with plan  I spent 15 minutes with the patient during this visit      [id number]

## 2020-07-24 ENCOUNTER — TELEPHONE (OUTPATIENT)
Dept: FAMILY MEDICINE CLINIC | Facility: CLINIC | Age: 60
End: 2020-07-24

## 2020-07-24 NOTE — TELEPHONE ENCOUNTER
Did you get the results of her thyroid test done yesterday?     She wants to confirm that you received this for her appointment in August [id number] cell  [phone]

## 2020-07-29 ENCOUNTER — TELEPHONE (OUTPATIENT)
Dept: FAMILY MEDICINE CLINIC | Facility: CLINIC | Age: 60
End: 2020-07-29

## 2020-07-29 DIAGNOSIS — E03.9 ACQUIRED HYPOTHYROIDISM: ICD-10-CM

## 2020-07-29 RX ORDER — LEVOTHYROXINE SODIUM 0.05 MG/1
25 TABLET ORAL
Qty: 30 TABLET | Refills: 3 | Status: SHIPPED | OUTPATIENT
Start: 2020-07-29 | End: 2020-08-19 | Stop reason: ALTCHOICE

## 2020-07-29 NOTE — TELEPHONE ENCOUNTER
I called pt to reschedule her Aug 5th appt  She is totally out of her levothyroxine and needs a refill until she can come in on Aug 19th  She did get her labs done (TSH), can you check them and see what dosage she needs and refill?

## 2020-07-29 NOTE — TELEPHONE ENCOUNTER
I recommend we increase her Levothyroxine dose to 50 mcg daily and send a new script to her pharmacy

## 2020-08-19 ENCOUNTER — OFFICE VISIT (OUTPATIENT)
Dept: FAMILY MEDICINE CLINIC | Facility: CLINIC | Age: 60
End: 2020-08-19
Payer: COMMERCIAL

## 2020-08-19 VITALS
RESPIRATION RATE: 16 BRPM | HEIGHT: 65 IN | BODY MASS INDEX: 26.87 KG/M2 | SYSTOLIC BLOOD PRESSURE: 162 MMHG | TEMPERATURE: 97.2 F | DIASTOLIC BLOOD PRESSURE: 98 MMHG | HEART RATE: 104 BPM | WEIGHT: 161.3 LBS

## 2020-08-19 DIAGNOSIS — Z13.220 SCREENING FOR CHOLESTEROL LEVEL: ICD-10-CM

## 2020-08-19 DIAGNOSIS — E03.9 ACQUIRED HYPOTHYROIDISM: ICD-10-CM

## 2020-08-19 DIAGNOSIS — E78.5 HYPERLIPIDEMIA, UNSPECIFIED HYPERLIPIDEMIA TYPE: ICD-10-CM

## 2020-08-19 DIAGNOSIS — I10 ESSENTIAL HYPERTENSION: Primary | ICD-10-CM

## 2020-08-19 PROCEDURE — 3080F DIAST BP >= 90 MM HG: CPT | Performed by: FAMILY MEDICINE

## 2020-08-19 PROCEDURE — 3077F SYST BP >= 140 MM HG: CPT | Performed by: FAMILY MEDICINE

## 2020-08-19 PROCEDURE — 99213 OFFICE O/P EST LOW 20 MIN: CPT | Performed by: FAMILY MEDICINE

## 2020-08-19 PROCEDURE — 3008F BODY MASS INDEX DOCD: CPT | Performed by: FAMILY MEDICINE

## 2020-08-19 PROCEDURE — 1036F TOBACCO NON-USER: CPT | Performed by: FAMILY MEDICINE

## 2020-08-19 NOTE — PROGRESS NOTES
Assessment/Plan:    1  Essential hypertension  - discussed Losartan or HCTZ, but patient declined, discussed life style changes and low sodium diet, continue home BP monitoring and bring BP log to the next visit    2  Hyperlipidemia  - Lipid Panel with Direct LDL reflex    3  Acquired hypothyroidism  - lab results 20 reviewed: TSH 5 04 with normal free T4 1 10  - s/s of hypothyroidism discussed, will recheck TFT's prior to next visit  - TSH, 3rd generation with Free T4 reflex    Follow up in 3-4 months or sooner as needed  The patient understands and agrees with the treatment plan  Subjective:   Chief Complaint   Patient presents with    Hypertension    Hyperlipidemia    Hypothyroidism      Patient ID: Warden Madera is a 63 y o  female who presents today for follow up visit for hypertension, hypothyroidism, hyperlipidemia  At her last visit on 20 she was started on Levothyroxine which she took for a few weeks and felt very tired and jittery and stopped taking it  She reports good home BP readings in 120-140's/ 80's  Patient was prescribed Lisinopril 10 mg daily in the past and stopped taking it due to cough, she was also prescribed Amlodipine 2 5 mg, but stopped taking it due to lightheadedness         The following portions of the patient's history were reviewed and updated as appropriate: allergies, current medications, past family history, past medical history, past social history, past surgical history and problem list     Past Medical History:   Diagnosis Date    Anxiety     Hyperlipidemia     Hypertension     Microscopic hematuria     Subclinical hypothyroidism      Past Surgical History:   Procedure Laterality Date    CATARACT EXTRACTION Right      SECTION      TONSILLECTOMY      TOTAL ABDOMINAL HYSTERECTOMY  2010    w/left ovary removal  Last assessed: 16     Family History   Problem Relation Age of Onset    Arthritis Mother     Heart disease Father     Diabetes Father     Diabetes type I Son         Mellitus    Mental illness Neg Hx     Substance Abuse Neg Hx     Alcohol abuse Neg Hx      Social History     Socioeconomic History    Marital status: /Civil Union     Spouse name: Not on file    Number of children: Not on file    Years of education: Not on file    Highest education level: Not on file   Occupational History    Not on file   Social Needs    Financial resource strain: Not on file    Food insecurity     Worry: Not on file     Inability: Not on file   [employer] needs     Medical: Not on file     Non-medical: Not on file   Tobacco Use    Smoking status: Never Smoker    Smokeless tobacco: Never Used   Substance and Sexual Activity    Alcohol use: No    Drug use: No    Sexual activity: Not on file   Lifestyle    Physical activity     Days per week: Not on file     Minutes per session: Not on file    Stress: Not on file   Relationships    Social connections     Talks on phone: Not on file     Gets together: Not on file     Attends Rastafari service: Not on file     Active member of club or organization: Not on file     Attends meetings of clubs or organizations: Not on file     Relationship status: Not on file    Intimate partner violence     Fear of current or ex partner: Not on file     Emotionally abused: Not on file     Physically abused: Not on file     Forced sexual activity: Not on file   Other Topics Concern    Not on file   Social History Narrative    Always uses seatbelts    Caffeine use: 2 cups coffee/day    Has smoke detectors     No current outpatient medications on file  Review of Systems   Constitutional: Negative for chills, fatigue and fever  Respiratory: Negative for cough, shortness of breath and wheezing  Cardiovascular: Negative for chest pain, palpitations and leg swelling  Gastrointestinal: Negative for abdominal pain, blood in stool, diarrhea, nausea and vomiting     Genitourinary: Negative for dysuria, frequency, hematuria and urgency  Neurological: Negative for dizziness, syncope, weakness, numbness and headaches  Hematological: Negative for adenopathy  Psychiatric/Behavioral: Negative for dysphoric mood and sleep disturbance  The patient is not nervous/anxious  Objective:    Vitals:    08/19/20 0806 08/19/20 0821   BP: (!) 180/102 162/98   BP Location: Left arm Left arm   Patient Position: Sitting Sitting   Cuff Size: Standard Standard   Pulse: 104    Resp: 16    Temp: (!) 97 2 °F (36 2 °C)    Weight: 73 2 kg (161 lb 4 8 oz)    Height: 5' 5 25" (1 657 m)      BP Readings from Last 3 Encounters:   08/19/20 162/98   03/24/20 145/95   11/12/19 142/88     Wt Readings from Last 3 Encounters:   08/19/20 73 2 kg (161 lb 4 8 oz)   11/12/19 69 9 kg (154 lb 3 2 oz)   10/21/19 70 1 kg (154 lb 9 6 oz)        Physical Exam  Constitutional:       General: She is not in acute distress  Appearance: She is well-developed  Neck:      Musculoskeletal: Neck supple  Thyroid: No thyromegaly  Cardiovascular:      Rate and Rhythm: Normal rate and regular rhythm  Heart sounds: Normal heart sounds  No murmur  Pulmonary:      Effort: Pulmonary effort is normal  No respiratory distress  Breath sounds: No wheezing, rhonchi or rales  Chest:      Chest wall: No tenderness  Abdominal:      General: There is no distension  Palpations: Abdomen is soft  There is no mass  Tenderness: There is no abdominal tenderness  Lymphadenopathy:      Cervical: No cervical adenopathy  Neurological:      Mental Status: She is alert and oriented to person, place, and time  Psychiatric:         Mood and Affect: Mood normal          Behavior: Behavior normal          Thought Content: Thought content normal             BMI Counseling: Body mass index is 26 64 kg/m²  The BMI is above normal  Nutrition recommendations include reducing portion sizes and consuming healthier snacks  Exercise recommendations include exercising 3-5 times per week

## 2020-12-03 ENCOUNTER — TELEPHONE (OUTPATIENT)
Dept: FAMILY MEDICINE CLINIC | Facility: CLINIC | Age: 60
End: 2020-12-03

## 2021-01-26 ENCOUNTER — OFFICE VISIT (OUTPATIENT)
Dept: FAMILY MEDICINE CLINIC | Facility: CLINIC | Age: 61
End: 2021-01-26
Payer: COMMERCIAL

## 2021-01-26 VITALS
HEART RATE: 98 BPM | SYSTOLIC BLOOD PRESSURE: 162 MMHG | DIASTOLIC BLOOD PRESSURE: 98 MMHG | RESPIRATION RATE: 16 BRPM | WEIGHT: 163 LBS | HEIGHT: 65 IN | BODY MASS INDEX: 27.16 KG/M2

## 2021-01-26 DIAGNOSIS — I10 ESSENTIAL HYPERTENSION: Primary | ICD-10-CM

## 2021-01-26 DIAGNOSIS — Z23 NEED FOR PROPHYLACTIC VACCINATION AND INOCULATION AGAINST INFLUENZA: ICD-10-CM

## 2021-01-26 DIAGNOSIS — E78.5 HYPERLIPIDEMIA, UNSPECIFIED HYPERLIPIDEMIA TYPE: ICD-10-CM

## 2021-01-26 DIAGNOSIS — E03.9 HYPOTHYROIDISM, UNSPECIFIED TYPE: ICD-10-CM

## 2021-01-26 DIAGNOSIS — B35.3 TINEA PEDIS OF RIGHT FOOT: ICD-10-CM

## 2021-01-26 PROCEDURE — 90682 RIV4 VACC RECOMBINANT DNA IM: CPT

## 2021-01-26 PROCEDURE — 1036F TOBACCO NON-USER: CPT | Performed by: FAMILY MEDICINE

## 2021-01-26 PROCEDURE — 99214 OFFICE O/P EST MOD 30 MIN: CPT | Performed by: FAMILY MEDICINE

## 2021-01-26 PROCEDURE — 90471 IMMUNIZATION ADMIN: CPT

## 2021-01-26 PROCEDURE — 3077F SYST BP >= 140 MM HG: CPT | Performed by: FAMILY MEDICINE

## 2021-01-26 PROCEDURE — 3080F DIAST BP >= 90 MM HG: CPT | Performed by: FAMILY MEDICINE

## 2021-01-26 PROCEDURE — 3008F BODY MASS INDEX DOCD: CPT | Performed by: FAMILY MEDICINE

## 2021-01-26 PROCEDURE — 3725F SCREEN DEPRESSION PERFORMED: CPT | Performed by: FAMILY MEDICINE

## 2021-01-26 RX ORDER — LOSARTAN POTASSIUM 25 MG/1
25 TABLET ORAL DAILY
Qty: 30 TABLET | Refills: 2 | Status: SHIPPED | OUTPATIENT
Start: 2021-01-26 | End: 2021-03-23 | Stop reason: SDUPTHER

## 2021-01-26 RX ORDER — KETOCONAZOLE 20 MG/G
CREAM TOPICAL 2 TIMES DAILY
Qty: 15 G | Refills: 0 | Status: SHIPPED | OUTPATIENT
Start: 2021-01-26 | End: 2021-09-03 | Stop reason: SDUPTHER

## 2021-01-27 NOTE — PROGRESS NOTES
Assessment/Plan:    1  Essential hypertension  - will start Losartan 25 mg daily  - losartan (COZAAR) 25 mg tablet; Take 1 tablet (25 mg total) by mouth daily  Dispense: 30 tablet; Refill: 2    2  Hypothyroidism, unspecified type  - recent lab results 11/30/2020 reviewed which showed TSH of 6 5, patient denies restarting Synthroid, will recheck TFT's in 6 months    3  Hyperlipidemia  - advised to work on healthy diet and regular exercise    4  Tinea pedis of right foot  - keep the site clean and dry and use Ketoconazole twice a day, follow up if not better  - ketoconazole (NIZORAL) 2 % cream; Apply topically 2 (two) times a day  Dispense: 15 g; Refill: 0    5  Need for prophylactic vaccination and inoculation against influenza  - influenza vaccine, quadrivalent, recombinant, PF, 0 5 mL, for patients 18 yr+ (FLUBLOK)    Return in 1-2 months or sooner as needed  The patient understands and agrees with the treatment plan  Subjective:   Chief Complaint   Patient presents with    Hypertension    Hypothyroidism    Hyperlipidemia      Patient ID: Gris Dixon is a 62 y o  female who presents today for follow up visit for hypertension, hypothyroidism, hyperlipidemia  Patient reports elevated home BP readings in 140-160's/ 80-90's  Patient was prescribed Lisinopril 10 mg daily in the past and stopped taking it due to cough, she was also prescribed Amlodipine 2 5 mg, but stopped taking it due to lightheadedness  Patient denies chest pain, shortness of breath, palpitations, leg edema, orthopnea, dizziness or syncope  Patient also reports itchy rash between her toes in right foot for the past few weeks         The following portions of the patient's history were reviewed and updated as appropriate: allergies, current medications, past family history, past medical history, past social history, past surgical history and problem list     Past Medical History:   Diagnosis Date    Anxiety     Hyperlipidemia     Hypertension     Microscopic hematuria     Subclinical hypothyroidism      Past Surgical History:   Procedure Laterality Date    CATARACT EXTRACTION Right      SECTION  1991    TONSILLECTOMY  1980    TOTAL ABDOMINAL HYSTERECTOMY  2010    w/left ovary removal  Last assessed: 16     Family History   Problem Relation Age of Onset    Arthritis Mother     Heart disease Father     Diabetes Father     Diabetes type I Son         Mellitus    Mental illness Neg Hx     Substance Abuse Neg Hx     Alcohol abuse Neg Hx      Social History     Socioeconomic History    Marital status: /Civil Union     Spouse name: Not on file    Number of children: Not on file    Years of education: Not on file    Highest education level: Not on file   Occupational History    Not on file   Social Needs    Financial resource strain: Not on file    Food insecurity     Worry: Not on file     Inability: Not on file   [employer] needs     Medical: Not on file     Non-medical: Not on file   Tobacco Use    Smoking status: Never Smoker    Smokeless tobacco: Never Used   Substance and Sexual Activity    Alcohol use: No    Drug use: No    Sexual activity: Not on file   Lifestyle    Physical activity     Days per week: Not on file     Minutes per session: Not on file    Stress: Not on file   Relationships    Social connections     Talks on phone: Not on file     Gets together: Not on file     Attends Judaism service: Not on file     Active member of club or organization: Not on file     Attends meetings of clubs or organizations: Not on file     Relationship status: Not on file    Intimate partner violence     Fear of current or ex partner: Not on file     Emotionally abused: Not on file     Physically abused: Not on file     Forced sexual activity: Not on file   Other Topics Concern    Not on file   Social History Narrative    Always uses seatbelts    Caffeine use: 2 cups coffee/day    Has smoke detectors       Current Outpatient Medications:     NON FORMULARY, Thyrogard, Disp: , Rfl:     ketoconazole (NIZORAL) 2 % cream, Apply topically 2 (two) times a day, Disp: 15 g, Rfl: 0    losartan (COZAAR) 25 mg tablet, Take 1 tablet (25 mg total) by mouth daily, Disp: 30 tablet, Rfl: 2    Review of Systems   Constitutional: Negative for chills, fatigue and fever  Respiratory: Negative for cough, shortness of breath and wheezing  Cardiovascular: Negative for chest pain, palpitations and leg swelling  Gastrointestinal: Negative for abdominal pain, blood in stool, diarrhea, nausea and vomiting  Genitourinary: Negative for dysuria, frequency, hematuria and urgency  Skin:        As per HPI   Neurological: Negative for dizziness, syncope, weakness, numbness and headaches  Hematological: Negative for adenopathy  Psychiatric/Behavioral: Negative for dysphoric mood and sleep disturbance  The patient is not nervous/anxious  Objective:    Vitals:    01/26/21 1804 01/26/21 2207   BP: 160/98 162/98   BP Location:  Left arm   Patient Position:  Sitting   Cuff Size:  Standard   Pulse: 98    Resp: 16    Weight: 73 9 kg (163 lb)    Height: 5' 5" (1 651 m)         Physical Exam  Constitutional:       General: She is not in acute distress  Appearance: Normal appearance  She is well-developed  Neck:      Musculoskeletal: Neck supple  Thyroid: No thyromegaly  Cardiovascular:      Rate and Rhythm: Normal rate and regular rhythm  Heart sounds: Normal heart sounds  No murmur  Pulmonary:      Effort: Pulmonary effort is normal  No respiratory distress  Breath sounds: No wheezing, rhonchi or rales  Chest:      Chest wall: No tenderness  Abdominal:      General: There is no distension  Palpations: Abdomen is soft  There is no mass  Tenderness: There is no abdominal tenderness  Musculoskeletal:      Right lower leg: No edema  Left lower leg: No edema  Lymphadenopathy:      Cervical: No cervical adenopathy  Skin:     Comments: Right foot with redness and scaling in web space between 3rd and 4th toes   Neurological:      Mental Status: She is alert and oriented to person, place, and time  Psychiatric:         Mood and Affect: Mood normal          Behavior: Behavior normal          Thought Content:  Thought content normal

## 2021-03-10 DIAGNOSIS — Z23 ENCOUNTER FOR IMMUNIZATION: ICD-10-CM

## 2021-03-23 ENCOUNTER — OFFICE VISIT (OUTPATIENT)
Dept: FAMILY MEDICINE CLINIC | Facility: CLINIC | Age: 61
End: 2021-03-23
Payer: COMMERCIAL

## 2021-03-23 VITALS
BODY MASS INDEX: 27.46 KG/M2 | HEIGHT: 65 IN | WEIGHT: 164.8 LBS | SYSTOLIC BLOOD PRESSURE: 140 MMHG | HEART RATE: 89 BPM | DIASTOLIC BLOOD PRESSURE: 90 MMHG | RESPIRATION RATE: 16 BRPM

## 2021-03-23 DIAGNOSIS — E03.9 HYPOTHYROIDISM, UNSPECIFIED TYPE: ICD-10-CM

## 2021-03-23 DIAGNOSIS — Z12.31 ENCOUNTER FOR SCREENING MAMMOGRAM FOR MALIGNANT NEOPLASM OF BREAST: ICD-10-CM

## 2021-03-23 DIAGNOSIS — I10 ESSENTIAL HYPERTENSION: Primary | ICD-10-CM

## 2021-03-23 DIAGNOSIS — Z11.59 NEED FOR HEPATITIS C SCREENING TEST: ICD-10-CM

## 2021-03-23 DIAGNOSIS — E78.5 HYPERLIPIDEMIA, UNSPECIFIED HYPERLIPIDEMIA TYPE: ICD-10-CM

## 2021-03-23 PROCEDURE — 3080F DIAST BP >= 90 MM HG: CPT | Performed by: FAMILY MEDICINE

## 2021-03-23 PROCEDURE — 99213 OFFICE O/P EST LOW 20 MIN: CPT | Performed by: FAMILY MEDICINE

## 2021-03-23 PROCEDURE — 3077F SYST BP >= 140 MM HG: CPT | Performed by: FAMILY MEDICINE

## 2021-03-23 PROCEDURE — 3008F BODY MASS INDEX DOCD: CPT | Performed by: FAMILY MEDICINE

## 2021-03-23 PROCEDURE — 1036F TOBACCO NON-USER: CPT | Performed by: FAMILY MEDICINE

## 2021-03-23 RX ORDER — LOSARTAN POTASSIUM 25 MG/1
25 TABLET ORAL DAILY
Qty: 90 TABLET | Refills: 3 | Status: SHIPPED | OUTPATIENT
Start: 2021-03-23 | End: 2022-03-08 | Stop reason: SDUPTHER

## 2021-03-23 NOTE — PROGRESS NOTES
Assessment/Plan:    1  Essential hypertension  - better controlled on Losartan, will recheck lab work prior to next visit  - losartan (COZAAR) 25 mg tablet; Take 1 tablet (25 mg total) by mouth daily  Dispense: 90 tablet; Refill: 3  - Comprehensive metabolic panel; Future    2  Hyperlipidemia  - continue working on healthy diet and regular exercise  - will recheck lipid panel prior to next visit  - Lipid Panel with Direct LDL reflex; Future    3  Hypothyroidism  - will recheck TFT's  - TSH, 3rd generation with Free T4 reflex; Future    4  Need for hepatitis C screening test  - Hepatitis C Antibody ([facility]); Future    Return in 3-4 months or sooner as needed  The patient understands and agrees with the treatment plan  Subjective:   Chief Complaint   Patient presents with    Hypertension    Hypothyroidism    Hyperlipidemia      Patient ID: Kvng Yanez is a 62 y o  female here for follow up visit for hypertension, hyperlipidemia  Since her last visit when patient was started on Losartan she has been doing much better, her home BP readings have improved and ranging in 130's/ 70's-80's, patient denies any side effects on Losartan  She is working on [employer] and started walking several days a week           The following portions of the patient's history were reviewed and updated as appropriate: allergies, current medications, past family history, past medical history, past social history, past surgical history and problem list     Past Medical History:   Diagnosis Date    Anxiety     Hyperlipidemia     Hypertension     Microscopic hematuria     Subclinical hypothyroidism      Past Surgical History:   Procedure Laterality Date    CATARACT EXTRACTION Right      SECTION      TONSILLECTOMY      TOTAL ABDOMINAL HYSTERECTOMY  2010    w/left ovary removal  Last assessed: 16     Family History   Problem Relation Age of Onset    Arthritis Mother     Heart disease Father     Diabetes Father     Diabetes type I Son         Mellitus    Mental illness Neg Hx     Substance Abuse Neg Hx     Alcohol abuse Neg Hx      Social History     Socioeconomic History    Marital status: /Civil Union     Spouse name: Not on file    Number of children: Not on file    Years of education: Not on file    Highest education level: Not on file   Occupational History    Not on file   Social Needs    Financial resource strain: Not on file    Food insecurity     Worry: Not on file     Inability: Not on file   [employer] needs     Medical: Not on file     Non-medical: Not on file   Tobacco Use    Smoking status: Never Smoker    Smokeless tobacco: Never Used   Substance and Sexual Activity    Alcohol use: No    Drug use: No    Sexual activity: Not on file   Lifestyle    Physical activity     Days per week: Not on file     Minutes per session: Not on file    Stress: Not on file   Relationships    Social connections     Talks on phone: Not on file     Gets together: Not on file     Attends [employer] service: Not on file     Active member of club or organization: Not on file     Attends meetings of clubs or organizations: Not on file     Relationship status: Not on file    Intimate partner violence     Fear of current or ex partner: Not on file     Emotionally abused: Not on file     Physically abused: Not on file     Forced sexual activity: Not on file   Other Topics Concern    Not on file   Social History Narrative    Always uses seatbelts    Caffeine use: 2 cups coffee/day    Has smoke detectors       Current Outpatient Medications:     ketoconazole (NIZORAL) 2 % cream, Apply topically 2 (two) times a day, Disp: 15 g, Rfl: 0    losartan (COZAAR) 25 mg tablet, Take 1 tablet (25 mg total) by mouth daily, Disp: 90 tablet, Rfl: 3    NON FORMULARY, Thyrogard, Disp: , Rfl:     Review of Systems   Constitutional: Negative for chills, fatigue and fever     Respiratory: Negative for cough, shortness of breath and wheezing  Cardiovascular: Negative for chest pain, palpitations and leg swelling  Gastrointestinal: Negative for abdominal pain, blood in stool, diarrhea, nausea and vomiting  Endocrine: Negative for cold intolerance and heat intolerance  Genitourinary: Negative for dysuria, frequency, hematuria and urgency  Neurological: Negative for dizziness, syncope, weakness, numbness and headaches  Psychiatric/Behavioral: Negative for dysphoric mood and sleep disturbance  The patient is not nervous/anxious  Objective:    Vitals:    03/23/21 1813   BP: 140/90   Pulse: 89   Resp: 16   Weight: 74 8 kg (164 lb 12 8 oz)   Height: 5' 5" (1 651 m)     Wt Readings from Last 3 Encounters:   03/23/21 74 8 kg (164 lb 12 8 oz)   01/26/21 73 9 kg (163 lb)   08/19/20 73 2 kg (161 lb 4 8 oz)     BP Readings from Last 3 Encounters:   03/23/21 140/90   01/26/21 162/98   08/19/20 162/98        Physical Exam  Constitutional:       General: She is not in acute distress  Appearance: She is well-developed  Neck:      Musculoskeletal: Neck supple  Thyroid: No thyromegaly  Cardiovascular:      Rate and Rhythm: Normal rate and regular rhythm  Heart sounds: Normal heart sounds  No murmur  Pulmonary:      Effort: Pulmonary effort is normal  No respiratory distress  Breath sounds: No wheezing, rhonchi or rales  Chest:      Chest wall: No tenderness  Abdominal:      General: There is no distension  Palpations: Abdomen is soft  There is no mass  Tenderness: There is no abdominal tenderness  Musculoskeletal:      Right lower leg: No edema  Left lower leg: No edema  Lymphadenopathy:      Cervical: No cervical adenopathy  Neurological:      Mental Status: She is alert and oriented to person, place, and time  Psychiatric:         Mood and Affect: Mood normal          Behavior: Behavior normal          Thought Content:  Thought content normal

## 2021-08-26 ENCOUNTER — RA CDI HCC (OUTPATIENT)
Dept: OTHER | Facility: HOSPITAL | Age: 61
End: 2021-08-26

## 2021-08-26 NOTE — PROGRESS NOTES
[facility]  coding opportunities       Chart reviewed, no opportunity found: CHART REVIEWED, NO OPPORTUNITY FOUND                        Patients insurance company: Rajendra Aguilera (Medicare Advantage and Commercial)

## 2021-09-03 ENCOUNTER — OFFICE VISIT (OUTPATIENT)
Dept: FAMILY MEDICINE CLINIC | Facility: CLINIC | Age: 61
End: 2021-09-03
Payer: COMMERCIAL

## 2021-09-03 VITALS
BODY MASS INDEX: 27.29 KG/M2 | WEIGHT: 163.8 LBS | DIASTOLIC BLOOD PRESSURE: 84 MMHG | OXYGEN SATURATION: 97 % | RESPIRATION RATE: 16 BRPM | SYSTOLIC BLOOD PRESSURE: 136 MMHG | HEART RATE: 98 BPM | HEIGHT: 65 IN

## 2021-09-03 DIAGNOSIS — I10 ESSENTIAL HYPERTENSION: ICD-10-CM

## 2021-09-03 DIAGNOSIS — Z23 ENCOUNTER FOR IMMUNIZATION: ICD-10-CM

## 2021-09-03 DIAGNOSIS — Z13.228 SCREENING FOR METABOLIC DISORDER: ICD-10-CM

## 2021-09-03 DIAGNOSIS — B35.3 TINEA PEDIS OF RIGHT FOOT: ICD-10-CM

## 2021-09-03 DIAGNOSIS — Z13.29 SCREENING FOR THYROID DISORDER: ICD-10-CM

## 2021-09-03 DIAGNOSIS — E78.5 HYPERLIPIDEMIA, UNSPECIFIED HYPERLIPIDEMIA TYPE: ICD-10-CM

## 2021-09-03 DIAGNOSIS — E03.9 HYPOTHYROIDISM, UNSPECIFIED TYPE: ICD-10-CM

## 2021-09-03 DIAGNOSIS — Z00.00 ANNUAL PHYSICAL EXAM: Primary | ICD-10-CM

## 2021-09-03 DIAGNOSIS — Z13.220 SCREENING FOR CHOLESTEROL LEVEL: ICD-10-CM

## 2021-09-03 DIAGNOSIS — E55.9 VITAMIN D DEFICIENCY: ICD-10-CM

## 2021-09-03 DIAGNOSIS — Z13.1 SCREENING FOR DIABETES MELLITUS: ICD-10-CM

## 2021-09-03 PROCEDURE — 90715 TDAP VACCINE 7 YRS/> IM: CPT | Performed by: FAMILY MEDICINE

## 2021-09-03 PROCEDURE — 99396 PREV VISIT EST AGE 40-64: CPT | Performed by: FAMILY MEDICINE

## 2021-09-03 PROCEDURE — 90471 IMMUNIZATION ADMIN: CPT | Performed by: FAMILY MEDICINE

## 2021-09-03 PROCEDURE — 90682 RIV4 VACC RECOMBINANT DNA IM: CPT | Performed by: FAMILY MEDICINE

## 2021-09-03 PROCEDURE — 90472 IMMUNIZATION ADMIN EACH ADD: CPT | Performed by: FAMILY MEDICINE

## 2021-09-03 PROCEDURE — 99213 OFFICE O/P EST LOW 20 MIN: CPT | Performed by: FAMILY MEDICINE

## 2021-09-03 RX ORDER — KETOCONAZOLE 20 MG/G
CREAM TOPICAL 2 TIMES DAILY
Qty: 30 G | Refills: 0 | Status: SHIPPED | OUTPATIENT
Start: 2021-09-03

## 2021-09-03 RX ORDER — ROSUVASTATIN CALCIUM 5 MG/1
5 TABLET, COATED ORAL
Qty: 30 TABLET | Refills: 5 | Status: SHIPPED | OUTPATIENT
Start: 2021-09-03 | End: 2022-03-08 | Stop reason: SDUPTHER

## 2021-09-03 NOTE — PROGRESS NOTES
ADULT ANNUAL PHYSICAL  [facility]    NAME: Laurence Hoskins  AGE: 62 y o  SEX: female  : 1960     DATE: 2021     Assessment and Plan:     1  Annual physical exam     2  Encounter for immunization  - influenza vaccine, quadrivalent, recombinant, PF, 0 5 mL, for patients 18 yr+ (FLUBLOK)  - TDAP VACCINE GREATER THAN OR EQUAL TO 8YO IM    Immunizations and preventive care screenings were discussed with patient today  Appropriate education was printed on patient's after visit summary  Counseling:  Alcohol/drug use: discussed moderation in alcohol intake, the recommendations for healthy alcohol use, and avoidance of illicit drug use  Dental Health: discussed importance of regular tooth brushing, flossing, and dental visits  Injury prevention: discussed safety/seat belts, safety helmets, smoke detectors, carbon dioxide detectors, and smoking near bedding or upholstery  · Exercise: the importance of regular exercise/physical activity was discussed  Recommend exercise 3-5 times per week for at least 30 minutes  Return in about 6 months (around 3/3/2022) for Recheck  Chief Complaint:     Chief Complaint   Patient presents with    Follow-up     Pt is here for 6 mos f/u BW      History of Present Illness:     Adult Annual Physical   Patient here for a comprehensive physical exam  The patient reports no problems  Diet and Physical Activity  · Diet/Nutrition: well balanced diet  · Exercise: walking  Depression Screening  PHQ-9 Depression Screening    PHQ-9:   Frequency of the following problems over the past two weeks:      Little interest or pleasure in doing things: 0 - not at all  Feeling down, depressed, or hopeless: 0 - not at all  PHQ-2 Score: 0       General Health  · Sleep: sleeps well  · Hearing: normal - bilateral   · Vision: goes for regular eye exams  · Dental: regular dental visits         /GYN Health  · Patient is: postmenopausal     Review of Systems:     Review of Systems   Constitutional: Negative for chills, fatigue and fever  HENT: Negative for congestion, ear pain and sore throat  Eyes: Negative for pain, discharge, redness, itching and visual disturbance  Respiratory: Negative for cough, shortness of breath and wheezing  Cardiovascular: Negative for chest pain, palpitations and leg swelling  Gastrointestinal: Negative for abdominal pain, blood in stool, constipation, diarrhea, nausea and vomiting  Endocrine: Negative for cold intolerance and heat intolerance  Genitourinary: Negative for dysuria, frequency, hematuria and urgency  Musculoskeletal: Negative for arthralgias and myalgias  Neurological: Negative for dizziness, syncope, weakness, numbness and headaches  Hematological: Negative for adenopathy  Psychiatric/Behavioral: Negative for dysphoric mood and sleep disturbance  The patient is not nervous/anxious         Past Medical History:     Past Medical History:   Diagnosis Date    Anxiety     Hyperlipidemia     Hypertension     Microscopic hematuria     Subclinical hypothyroidism       Past Surgical History:     Past Surgical History:   Procedure Laterality Date    CATARACT EXTRACTION Right      SECTION      TONSILLECTOMY  1980    TOTAL ABDOMINAL HYSTERECTOMY  2010    w/left ovary removal  Last assessed: 16      Social History:     Social History     Socioeconomic History    Marital status: /Civil Union     Spouse name: None    Number of children: None    Years of education: None    Highest education level: None   Occupational History    None   Tobacco Use    Smoking status: Never Smoker    Smokeless tobacco: Never Used   Vaping Use    Vaping Use: Never used   Substance and Sexual Activity    Alcohol use: No    Drug use: No    Sexual activity: None   Other Topics Concern    None   Social History Narrative    Always uses seatbelts    Caffeine use: 2 cups coffee/day    Has smoke detectors     Social Determinants of Health     Financial Resource Strain:     Difficulty of Paying Living Expenses:    Food Insecurity:     Worried About Running Out of Food in the Last Year:     [address] in the Last Year:    Transportation Needs:     Lack of Transportation (Medical):  Lack of Transportation (Non-Medical):    Physical Activity:     Days of Exercise per Week:     Minutes of Exercise per Session:    Stress:     Feeling of Stress :    Social Connections:     Frequency of Communication with Friends and Family:     Frequency of Social Gatherings with Friends and Family:     Attends [employer] Services:     Active Member of Clubs or Organizations:     Attends Club or Organization Meetings:     Marital Status:    Intimate Partner Violence:     Fear of Current or Ex-Partner:     Emotionally Abused:     Physically Abused:     Sexually Abused:       Family History:     Family History   Problem Relation Age of Onset    Arthritis Mother     Heart disease Father     Diabetes Father     Diabetes type I Son         Mellitus    Thyroid disease Sister     Mental illness Neg Hx     Substance Abuse Neg Hx     Alcohol abuse Neg Hx       Current Medications:     Current Outpatient Medications   Medication Sig Dispense Refill    ketoconazole (NIZORAL) 2 % cream Apply topically 2 (two) times a day 30 g 0    losartan (COZAAR) 25 mg tablet Take 1 tablet (25 mg total) by mouth daily 90 tablet 3    NON FORMULARY Thyrogard      rosuvastatin (CRESTOR) 5 mg tablet Take 1 tablet (5 mg total) by mouth daily after dinner 30 tablet 5     No current facility-administered medications for this visit  Allergies: Allergies   Allergen Reactions    Zithromax [Azithromycin]      Other reaction(s): GI Upset  Category:  Allergy;     Clindamycin Dizziness and Fatigue    Codeine Drowsiness      Physical Exam:     /84 (BP Location: Left arm, Patient Position: Sitting, Cuff Size: Standard)   Pulse 98   Resp 16   Ht 5' 5" (1 651 m)   Wt 74 3 kg (163 lb 12 8 oz)   SpO2 97%   BMI 27 26 kg/m²     Physical Exam  Constitutional:       General: She is not in acute distress  Appearance: She is well-developed  HENT:      Head: Normocephalic and atraumatic  Right Ear: Tympanic membrane, ear canal and external ear normal       Left Ear: Tympanic membrane, ear canal and external ear normal       Mouth/Throat:      Mouth: Mucous membranes are moist       Pharynx: Oropharynx is clear  Eyes:      General: No scleral icterus  Extraocular Movements: Extraocular movements intact  Conjunctiva/sclera: Conjunctivae normal       Pupils: Pupils are equal, round, and reactive to light  Neck:      Thyroid: No thyromegaly  Vascular: No JVD  Cardiovascular:      Rate and Rhythm: Normal rate and regular rhythm  Heart sounds: Normal heart sounds  No murmur heard  Pulmonary:      Effort: Pulmonary effort is normal  No respiratory distress  Breath sounds: Normal breath sounds  No wheezing, rhonchi or rales  Abdominal:      General: Bowel sounds are normal  There is no distension  Palpations: Abdomen is soft  There is no mass  Tenderness: There is no abdominal tenderness  Musculoskeletal:      Cervical back: Neck supple  Right lower leg: No edema  Left lower leg: No edema  Lymphadenopathy:      Cervical: No cervical adenopathy  Skin:     Findings: No rash  Neurological:      General: No focal deficit present  Mental Status: She is alert and oriented to person, place, and time  Cranial Nerves: No cranial nerve deficit  Psychiatric:         Mood and Affect: Mood normal          Behavior: Behavior normal          Thought Content:  Thought content normal          Judgment: Judgment normal         Leslee Moreira MD  [facility]

## 2021-09-03 NOTE — PATIENT INSTRUCTIONS

## 2021-09-06 NOTE — PROGRESS NOTES
Assessment/Plan:    1  Essential hypertension  - acceptable control continue losartan 25 mg daily  - Comprehensive metabolic panel; Future    2  Hyperlipidemia  - , , advised to start Crestor and continue working on healthy diet and regular exercise, will recheck lipid panel and CMP in 5-6 months  - rosuvastatin (CRESTOR) 5 mg tablet; Take 1 tablet (5 mg total) by mouth daily after dinner  Dispense: 30 tablet; Refill: 5  - Lipid Panel with Direct LDL reflex; Future  - Comprehensive metabolic panel; Future    3  Hypothyroidism  - TSH elevated at 6 63 with normal free T4 0 93, overall stable, s/s of hypothyroidism discussed, will recheck TFT's in 5-6 months  - TSH, 3rd generation with Free T4 reflex; Future    4  Vitamin D deficiency  - Vitamin D 25 hydroxy; Future       Follow up in 6 months or sooner as needed  The treatment plan and possible side effects of new medications were reviewed with the patient today  The patient understands and agrees with the treatment plan  Subjective:     Chief Complaint   Patient presents with    Follow-up     Pt is here for 6 mos f/u BW      Patient ID: Caio Gallo is a 62 y o  female who presents today for follow up visit for hypertension, hyperlipidemia, subclinical hypothyroidism and review her lab results  Patient states she is feeling well and has no complaints at this time, she is taking her losartan as prescribed and reports good home BP readings in 130's/ 80's         The following portions of the patient's history were reviewed and updated as appropriate: allergies, current medications, past family history, past medical history, past social history, past surgical history and problem list     Past Medical History:   Diagnosis Date    Anxiety     Hyperlipidemia     Hypertension     Microscopic hematuria     Subclinical hypothyroidism      Past Surgical History:   Procedure Laterality Date    CATARACT EXTRACTION Right    [facility] [address] TONSILLECTOMY  1980    TOTAL ABDOMINAL HYSTERECTOMY  06/29/2010    w/left ovary removal  Last assessed: 5/24/16     Family History   Problem Relation Age of Onset    Arthritis Mother     Heart disease Father     Diabetes Father     Diabetes type I Son         Mellitus    Thyroid disease Sister     Mental illness Neg Hx     Substance Abuse Neg Hx     Alcohol abuse Neg Hx      Social History     Socioeconomic History    Marital status: /Civil Union     Spouse name: Not on file    Number of children: Not on file    Years of education: Not on file    Highest education level: Not on file   Occupational History    Not on file   Tobacco Use    Smoking status: Never Smoker    Smokeless tobacco: Never Used   Vaping Use    Vaping Use: Never used   Substance and Sexual Activity    Alcohol use: No    Drug use: No    Sexual activity: Not on file   Other Topics Concern    Not on file   Social History Narrative    Always uses seatbelts    Caffeine use: 2 cups coffee/day    Has smoke detectors     Social Determinants of Health     Financial Resource Strain:     Difficulty of Paying Living Expenses:    Food Insecurity:     Worried About Running Out of Food in the Last Year:     Ran Out of Food in the Last Year:    Transportation Needs:     Lack of Transportation (Medical):      Lack of Transportation (Non-Medical):    Physical Activity:     Days of Exercise per Week:     Minutes of Exercise per Session:    Stress:     Feeling of Stress :    Social Connections:     Frequency of Communication with Friends and Family:     Frequency of Social Gatherings with Friends and Family:     Attends [employer] Services:     Active Member of Clubs or Organizations:     Attends Club or Organization Meetings:     Marital Status:    Intimate Partner Violence:     Fear of Current or Ex-Partner:     Emotionally Abused:     Physically Abused:     Sexually Abused:        Current Outpatient Medications:    ketoconazole (NIZORAL) 2 % cream, Apply topically 2 (two) times a day, Disp: 30 g, Rfl: 0    losartan (COZAAR) 25 mg tablet, Take 1 tablet (25 mg total) by mouth daily, Disp: 90 tablet, Rfl: 3    NON FORMULARY, Thyrogard, Disp: , Rfl:     rosuvastatin (CRESTOR) 5 mg tablet, Take 1 tablet (5 mg total) by mouth daily after dinner, Disp: 30 tablet, Rfl: 5    Review of Systems   Constitutional: Negative for chills, fatigue and fever  Respiratory: Negative for cough, shortness of breath and wheezing  Cardiovascular: Negative for chest pain, palpitations and leg swelling  Gastrointestinal: Negative for abdominal pain, blood in stool, diarrhea, nausea and vomiting  Endocrine: Negative for cold intolerance and heat intolerance  Genitourinary: Negative for dysuria, frequency, hematuria and urgency  Neurological: Negative for dizziness, syncope, weakness, numbness and headaches  Psychiatric/Behavioral: Negative for dysphoric mood and sleep disturbance  The patient is not nervous/anxious  Objective:    Vitals:    09/03/21 0836 09/03/21 0906   BP: 140/90 136/84   BP Location:  Left arm   Patient Position:  Sitting   Cuff Size:  Standard   Pulse: 98    Resp: 16    SpO2: 97%    Weight: 74 3 kg (163 lb 12 8 oz)    Height: 5' 5" (1 651 m)      Wt Readings from Last 3 Encounters:   09/03/21 74 3 kg (163 lb 12 8 oz)   03/23/21 74 8 kg (164 lb 12 8 oz)   01/26/21 73 9 kg (163 lb)     BP Readings from Last 3 Encounters:   09/03/21 136/84   03/23/21 140/90   01/26/21 162/98        Physical Exam  Constitutional:       General: She is not in acute distress  Appearance: She is well-developed  Neck:      Thyroid: No thyromegaly  Cardiovascular:      Rate and Rhythm: Normal rate and regular rhythm  Heart sounds: Normal heart sounds  No murmur heard  Pulmonary:      Effort: Pulmonary effort is normal  No respiratory distress  Breath sounds: No wheezing, rhonchi or rales     Chest:      Chest wall: No tenderness  Abdominal:      General: There is no distension  Palpations: Abdomen is soft  There is no mass  Tenderness: There is no abdominal tenderness  Musculoskeletal:      Cervical back: Neck supple  Right lower leg: No edema  Left lower leg: No edema  Lymphadenopathy:      Cervical: No cervical adenopathy  Neurological:      Mental Status: She is alert and oriented to person, place, and time  Psychiatric:         Mood and Affect: Mood normal          Behavior: Behavior normal          Thought Content: Thought content normal         BMI Counseling: Body mass index is 27 26 kg/m²  The BMI is above normal  Nutrition recommendations include 3-5 servings of fruits/vegetables daily and consuming healthier snacks  Exercise recommendations include exercising 3-5 times per week

## 2021-12-20 ENCOUNTER — VBI (OUTPATIENT)
Dept: ADMINISTRATIVE | Facility: OTHER | Age: 61
End: 2021-12-20

## 2022-03-07 ENCOUNTER — TELEPHONE (OUTPATIENT)
Dept: FAMILY MEDICINE CLINIC | Facility: CLINIC | Age: 62
End: 2022-03-07

## 2022-03-07 NOTE — TELEPHONE ENCOUNTER
Spoke to patient and changed apt to virtual      Patient was tested at [facility] waiting results

## 2022-03-07 NOTE — TELEPHONE ENCOUNTER
Patient's  has COVID and she is asking if she can do her 6 PM appointment on Tuesday with you virtually

## 2022-03-08 ENCOUNTER — TELEMEDICINE (OUTPATIENT)
Dept: FAMILY MEDICINE CLINIC | Facility: CLINIC | Age: 62
End: 2022-03-08
Payer: COMMERCIAL

## 2022-03-08 VITALS
DIASTOLIC BLOOD PRESSURE: 82 MMHG | TEMPERATURE: 98 F | WEIGHT: 159 LBS | SYSTOLIC BLOOD PRESSURE: 125 MMHG | BODY MASS INDEX: 26.46 KG/M2

## 2022-03-08 DIAGNOSIS — E55.9 VITAMIN D DEFICIENCY: ICD-10-CM

## 2022-03-08 DIAGNOSIS — J06.9 UPPER RESPIRATORY TRACT INFECTION, UNSPECIFIED TYPE: ICD-10-CM

## 2022-03-08 DIAGNOSIS — E78.5 HYPERLIPIDEMIA, UNSPECIFIED HYPERLIPIDEMIA TYPE: ICD-10-CM

## 2022-03-08 DIAGNOSIS — I10 ESSENTIAL HYPERTENSION: Primary | ICD-10-CM

## 2022-03-08 DIAGNOSIS — E03.9 HYPOTHYROIDISM, UNSPECIFIED TYPE: ICD-10-CM

## 2022-03-08 PROCEDURE — 3074F SYST BP LT 130 MM HG: CPT | Performed by: FAMILY MEDICINE

## 2022-03-08 PROCEDURE — 3079F DIAST BP 80-89 MM HG: CPT | Performed by: FAMILY MEDICINE

## 2022-03-08 PROCEDURE — 3725F SCREEN DEPRESSION PERFORMED: CPT | Performed by: FAMILY MEDICINE

## 2022-03-08 PROCEDURE — 1036F TOBACCO NON-USER: CPT | Performed by: FAMILY MEDICINE

## 2022-03-08 PROCEDURE — 99213 OFFICE O/P EST LOW 20 MIN: CPT | Performed by: FAMILY MEDICINE

## 2022-03-08 RX ORDER — ROSUVASTATIN CALCIUM 5 MG/1
5 TABLET, COATED ORAL
Qty: 90 TABLET | Refills: 3 | Status: SHIPPED | OUTPATIENT
Start: 2022-03-08

## 2022-03-08 RX ORDER — LOSARTAN POTASSIUM 25 MG/1
25 TABLET ORAL DAILY
Qty: 90 TABLET | Refills: 3 | Status: SHIPPED | OUTPATIENT
Start: 2022-03-08

## 2022-03-10 NOTE — PROGRESS NOTES
Virtual Regular Visit    Verification of patient location:    Patient is located in the following state in which I hold an active license [place]      Assessment/Plan:    1  Essential hypertension  - well controlled, continue losartan  - losartan (COZAAR) 25 mg tablet; Take 1 tablet (25 mg total) by mouth daily  Dispense: 90 tablet; Refill: 3    2  Hypothyroidism, subclinical  - TSH 4 08, within normal range, will continue monitoring and recheck TFT"s in 6 months    3  Hyperlipidemia, unspecified hyperlipidemia type  - , improved from 179, HDL 42, continue Crestor and life style changes   - rosuvastatin (CRESTOR) 5 mg tablet; Take 1 tablet (5 mg total) by mouth daily after dinner  Dispense: 90 tablet; Refill: 3    4  Upper respiratory tract infection  - advised rest, plenty of fluids, Mucinex as needed  - encouraged to follow up for persistent ro worsening symptoms    5  Vitamin D deficiency  - start vitamin D 2000 IU daily     Follow up in 3-4 months or sooner if needed  The patient understands and agrees with the treatment plan      Reason for visit is   Chief Complaint   Patient presents with    Hypertension    Hyperlipidemia    Virtual regular visit     pt is waiting for covid test results from [facility]    Virtual Regular Visit      Encounter provider Chris Moscoso MD    Provider located at [address] [place] [address]  [phone]      Recent Visits  Date Type Provider Dept   03/08/22 ROGE Rangel MD [address]   03/07/22 Telephone [facility]   Showing recent visits within past 7 days and meeting all other requirements  Future Appointments  No visits were found meeting these conditions    Showing future appointments within next 150 days and meeting all other requirements       The patient was identified by name and date of birth  Danya Durán was informed that this is a telemedicine visit and that the visit is being conducted through [address] [place] [address] Now and patient was informed that this is a secure, HIPAA-compliant platform  She agrees to proceed     My office door was closed  No one else was in the room  She acknowledged consent and understanding of privacy and security of the video platform  The patient has agreed to participate and understands they can discontinue the visit at any time  Patient is aware this is a billable service  Subjective  Danya Durán is a 62 y o  female who reports nasal congestion and runny nose for a few days, her  tested positive for Covid last week, so patient had Covid PCR swab done at [facility] yesterday which came back negative  Patient denies fever/chills, chest pain or shortness of breath  Patient offers no other complaints, she is taking her losartan as directed and reports good home BP readings in 120's-130's/ 70's-80's          Past Medical History:   Diagnosis Date    Anxiety     Hyperlipidemia     Hypertension     Microscopic hematuria     Subclinical hypothyroidism        Past Surgical History:   Procedure Laterality Date    CATARACT EXTRACTION Right      SECTION      TONSILLECTOMY      TOTAL ABDOMINAL HYSTERECTOMY  2010    w/left ovary removal  Last assessed: 16       Current Outpatient Medications   Medication Sig Dispense Refill    ketoconazole (NIZORAL) 2 % cream Apply topically 2 (two) times a day 30 g 0    losartan (COZAAR) 25 mg tablet Take 1 tablet (25 mg total) by mouth daily 90 tablet 3    NON FORMULARY Thyrogard      rosuvastatin (CRESTOR) 5 mg tablet Take 1 tablet (5 mg total) by mouth daily after dinner 90 tablet 3     No current facility-administered medications for this visit  Allergies   Allergen Reactions    Zithromax [Azithromycin]      Other reaction(s): GI Upset  Category:  Allergy;     Clindamycin Dizziness and Fatigue    Codeine Drowsiness       Review of Systems   Constitutional: Negative for appetite change, chills, fatigue and fever  HENT: Positive for congestion and rhinorrhea  Negative for sore throat  Respiratory: Negative for cough, shortness of breath and wheezing  Cardiovascular: Negative for chest pain, palpitations and leg swelling  Gastrointestinal: Negative for abdominal pain, blood in stool, diarrhea, nausea and vomiting  Endocrine: Negative for cold intolerance and heat intolerance  Genitourinary: Negative for dysuria, frequency, hematuria and urgency  Neurological: Negative for dizziness, syncope, weakness, numbness and headaches  Psychiatric/Behavioral: Negative for dysphoric mood and sleep disturbance  The patient is not nervous/anxious  Video Exam    Vitals:    03/08/22 1753   BP: 125/82   Temp: 98 °F (36 7 °C)   Weight: 72 1 kg (159 lb)       Physical Exam  Constitutional:       General: She is not in acute distress  Pulmonary:      Effort: Pulmonary effort is normal  No respiratory distress  Neurological:      Mental Status: She is alert and oriented to person, place, and time  Psychiatric:         Mood and Affect: Mood normal          Behavior: Behavior normal           I spent 15 minutes directly with the patient during this visit    VIRTUAL VISIT DISCLAIMER      Siobhan Lopez verbally agrees to participate in [employer]  Pt is aware that [employer] could be limited without vital signs or the ability to perform a full hands-on physical exam  Danuta Hutton understands she or the provider may request at any time to terminate the video visit and request the patient to seek care or treatment in person

## 2022-07-14 ENCOUNTER — ANNUAL EXAM (OUTPATIENT)
Dept: OBGYN CLINIC | Facility: CLINIC | Age: 62
End: 2022-07-14
Payer: COMMERCIAL

## 2022-07-14 VITALS
DIASTOLIC BLOOD PRESSURE: 84 MMHG | HEIGHT: 65 IN | WEIGHT: 147 LBS | BODY MASS INDEX: 24.49 KG/M2 | SYSTOLIC BLOOD PRESSURE: 144 MMHG

## 2022-07-14 DIAGNOSIS — N95.2 ATROPHIC VAGINITIS: ICD-10-CM

## 2022-07-14 DIAGNOSIS — Z01.419 ENCNTR FOR GYN EXAM (GENERAL) (ROUTINE) W/O ABN FINDINGS: Primary | ICD-10-CM

## 2022-07-14 DIAGNOSIS — Z12.31 ENCOUNTER FOR SCREENING MAMMOGRAM FOR MALIGNANT NEOPLASM OF BREAST: ICD-10-CM

## 2022-07-14 PROCEDURE — S0612 ANNUAL GYNECOLOGICAL EXAMINA: HCPCS | Performed by: OBSTETRICS & GYNECOLOGY

## 2022-07-14 NOTE — PROGRESS NOTES
Annual Wellness Visit  [address], [address], [facility], [address] [facility]    ASSESSMENT/PLAN: Sincere Garner is a 62 y o   who presents for annual gynecologic exam     Encounter for routine gynecologic examination  - Routine well woman exam completed today  - Cervical Cancer Screening: Current ASCCP Guidelines reviewed  Last Pap: Not on file not indicated s/p LAUREN    - Contraceptive counseling discussed  Current contraception: no method  - Breast Cancer Screening: Last Mammogram 2021  - Colorectal cancer screening last uncertain, next due 1-2 years  - The following were reviewed in today's visit: breast self exam    Additional problems addressed during this visit:  1  Encntr for gyn exam (general) (routine) w/o abn findings    2  Encounter for screening mammogram for malignant neoplasm of breast  -     Mammo screening bilateral w 3d & cad; Future    3  Atrophic vaginitis       -  Patient declines vaginal estrogen  Next visit: 1 yr      CC:  Annual Gynecologic Examination    HPI: Sincere Garner is a 62 y o   who presents for annual gynecologic examination  HPI    Gyn History  No LMP recorded  Patient has had a hysterectomy  Last Pap: Not on file was normal    She  reports being sexually active and has had partner(s) who are male  She reports using the following method of birth control/protection: Female Sterilization  OB History      Past Medical History:  No date:  Anxiety  No date: Hyperlipidemia  No date: Hypertension  No date: Microscopic hematuria  No date: Subclinical hypothyroidism     Past Surgical History:  No date: CATARACT EXTRACTION; Right  1991: [address]: TONSILLECTOMY  2010: TOTAL ABDOMINAL HYSTERECTOMY      Comment:  w/left ovary removal  Last assessed: 16     Family History   Problem Relation Age of Onset    Arthritis Mother     Heart disease Father     Diabetes Father     Diabetes type I Son         Mellitus    Thyroid disease Sister     Aneurysm Sister     No Known Problems Sister     Mental illness Neg Hx     Substance Abuse Neg Hx     Alcohol abuse Neg Hx         Social History     Tobacco Use    Smoking status: Never Smoker    Smokeless tobacco: Never Used   Vaping Use    Vaping Use: Never used   Substance Use Topics    Alcohol use: No    Drug use: No          Current Outpatient Medications:     losartan (COZAAR) 25 mg tablet, Take 1 tablet (25 mg total) by mouth daily, Disp: 90 tablet, Rfl: 3    rosuvastatin (CRESTOR) 5 mg tablet, Take 1 tablet (5 mg total) by mouth daily after dinner, Disp: 90 tablet, Rfl: 3    ketoconazole (NIZORAL) 2 % cream, Apply topically 2 (two) times a day, Disp: 30 g, Rfl: 0    NON FORMULARY, Thyrogard, Disp: , Rfl:     She is allergic to zithromax [azithromycin], clindamycin, and codeine       ROS negative except as noted in HPI    Objective:  /84 (BP Location: Left arm, Patient Position: Sitting, Cuff Size: Standard)   Ht 5' 5" (1 651 m)   Wt 66 7 kg (147 lb)   BMI 24 46 kg/m²      Physical Exam  Vitals and nursing note reviewed  HENT:      Head: Normocephalic  Chest:   Breasts: Breasts are symmetrical       Right: Normal  No bleeding, mass, nipple discharge, skin change, tenderness or axillary adenopathy  Left: Normal  No bleeding, mass, nipple discharge, skin change, tenderness or axillary adenopathy  Abdominal:      General: There is no distension  Palpations: Abdomen is soft  There is no mass  Tenderness: There is no abdominal tenderness  There is no rebound  Genitourinary:     General: Normal vulva  Exam position: Lithotomy position  Labia:         Right: No rash, tenderness or lesion  Left: No rash, tenderness or lesion  Urethra: No urethral pain or urethral lesion  Vagina: Normal  No vaginal discharge  Adnexa: Right adnexa normal and left adnexa normal       Rectum: No external hemorrhoid  Comments: Cervix/uterus surgically absent  Atrophic vaginal mucosa  Musculoskeletal:      Right lower leg: No edema  Left lower leg: No edema  Lymphadenopathy:      Upper Body:      Right upper body: No axillary or pectoral adenopathy  Left upper body: No axillary or pectoral adenopathy  Skin:     General: Skin is warm  Neurological:      Mental Status: She is alert and oriented to person, place, and time  Psychiatric:         Mood and Affect: Mood normal          Behavior: Behavior normal          Thought Content:  Thought content normal

## 2022-08-16 ENCOUNTER — OFFICE VISIT (OUTPATIENT)
Dept: FAMILY MEDICINE CLINIC | Facility: CLINIC | Age: 62
End: 2022-08-16
Payer: COMMERCIAL

## 2022-08-16 VITALS
WEIGHT: 156 LBS | HEIGHT: 66 IN | BODY MASS INDEX: 25.07 KG/M2 | HEART RATE: 84 BPM | SYSTOLIC BLOOD PRESSURE: 136 MMHG | RESPIRATION RATE: 16 BRPM | DIASTOLIC BLOOD PRESSURE: 88 MMHG

## 2022-08-16 DIAGNOSIS — I10 ESSENTIAL HYPERTENSION: Primary | ICD-10-CM

## 2022-08-16 DIAGNOSIS — Z13.228 SCREENING FOR METABOLIC DISORDER: ICD-10-CM

## 2022-08-16 DIAGNOSIS — Z13.29 SCREENING FOR THYROID DISORDER: ICD-10-CM

## 2022-08-16 DIAGNOSIS — E03.9 HYPOTHYROIDISM, UNSPECIFIED TYPE: ICD-10-CM

## 2022-08-16 DIAGNOSIS — Z13.1 SCREENING FOR DIABETES MELLITUS: ICD-10-CM

## 2022-08-16 DIAGNOSIS — Z13.220 SCREENING FOR CHOLESTEROL LEVEL: ICD-10-CM

## 2022-08-16 DIAGNOSIS — E78.5 HYPERLIPIDEMIA, UNSPECIFIED HYPERLIPIDEMIA TYPE: ICD-10-CM

## 2022-08-16 PROCEDURE — 3079F DIAST BP 80-89 MM HG: CPT | Performed by: FAMILY MEDICINE

## 2022-08-16 PROCEDURE — 99214 OFFICE O/P EST MOD 30 MIN: CPT | Performed by: FAMILY MEDICINE

## 2022-08-16 PROCEDURE — 3075F SYST BP GE 130 - 139MM HG: CPT | Performed by: FAMILY MEDICINE

## 2022-08-17 NOTE — PROGRESS NOTES
Assessment/Plan:    1  Essential hypertension  - better controlled, patient reports good home BP readings in 120's - 130's/ 80's, continue losartan 25 mg daily   - Comprehensive metabolic panel; Future    2  Hyperlipidemia  - recent , HDL 42, continue Crestor and life style changes, will repeat lipids and CMP prior to next visit  - Lipid Panel with Direct LDL reflex; Future  - Comprehensive metabolic panel; Future    3  Hypothyroidism  - recent TSH 4 08, within normal range  - will repeat TFT's prior to next visit  - TSH, 3rd generation with Free T4 reflex; Future       Follow up in 6 months or sooner as needed  The patient understands and agrees with the treatment plan  Subjective:   Chief Complaint   Patient presents with    Hypertension    Hypothyroidism    Hyperlipidemia      Patient ID: Mandy Waldron is a 62 y o  female who presents today for follow up visit for hypertension, hyperlipidemia  Patent offers no complaints at this time, she is compliant with her medications         The following portions of the patient's history were reviewed and updated as appropriate: allergies, current medications, past family history, past medical history, past social history, past surgical history and problem list     Past Medical History:   Diagnosis Date    Anxiety     Hyperlipidemia     Hypertension     Microscopic hematuria     Subclinical hypothyroidism      Past Surgical History:   Procedure Laterality Date    CATARACT EXTRACTION Right      SECTION      TONSILLECTOMY      TOTAL ABDOMINAL HYSTERECTOMY  2010    w/left ovary removal  Last assessed: 16     Family History   Problem Relation Age of Onset    Arthritis Mother     Heart disease Father     Diabetes Father     Diabetes type I Son         Mellitus    Thyroid disease Sister     Aneurysm Sister     No Known Problems Sister     Mental illness Neg Hx     Substance Abuse Neg Hx     Alcohol abuse Neg Hx Social History     Socioeconomic History    Marital status: /Civil Union     Spouse name: Not on file    Number of children: Not on file    Years of education: Not on file    Highest education level: Not on file   Occupational History    Not on file   Tobacco Use    Smoking status: Never Smoker    Smokeless tobacco: Never Used   Vaping Use    Vaping Use: Never used   Substance and Sexual Activity    Alcohol use: No    Drug use: No    Sexual activity: Yes     Partners: Male     Birth control/protection: Female Sterilization   Other Topics Concern    Not on file   Social History Narrative    Always uses seatbelts    Caffeine use: 2 cups coffee/day    Has smoke detectors     Social Determinants of Health     Financial Resource Strain: Not on file   Food Insecurity: Not on file   Transportation Needs: Not on file   Physical Activity: Not on file   Stress: Not on file   Social Connections: Not on file   Intimate Partner Violence: Not on file   Housing Stability: Not on file       Current Outpatient Medications:     ketoconazole (NIZORAL) 2 % cream, Apply topically 2 (two) times a day, Disp: 30 g, Rfl: 0    losartan (COZAAR) 25 mg tablet, Take 1 tablet (25 mg total) by mouth daily, Disp: 90 tablet, Rfl: 3    rosuvastatin (CRESTOR) 5 mg tablet, Take 1 tablet (5 mg total) by mouth daily after dinner, Disp: 90 tablet, Rfl: 3    NON FORMULARY, Thyrogard (Patient not taking: Reported on 8/16/2022), Disp: , Rfl:     Review of Systems   Constitutional: Negative for appetite change, chills, fatigue and fever  Respiratory: Negative for cough, shortness of breath and wheezing  Cardiovascular: Negative for chest pain, palpitations and leg swelling  Gastrointestinal: Negative for abdominal pain, blood in stool, diarrhea, nausea and vomiting  Endocrine: Negative for cold intolerance and heat intolerance  Genitourinary: Negative for dysuria, frequency, hematuria and urgency     Neurological: Negative for dizziness, syncope, weakness, numbness and headaches  Psychiatric/Behavioral: Negative for dysphoric mood and sleep disturbance  The patient is not nervous/anxious  Objective:    Vitals:    08/16/22 0903 08/16/22 0911   BP: 140/90 136/88   BP Location:  Left arm   Patient Position:  Sitting   Cuff Size:  Standard   Pulse: 84    Resp: 16    Weight: 70 8 kg (156 lb)    Height: 5' 5 5" (1 664 m)      Wt Readings from Last 3 Encounters:   08/16/22 70 8 kg (156 lb)   07/14/22 66 7 kg (147 lb)   03/08/22 72 1 kg (159 lb)     BP Readings from Last 3 Encounters:   08/16/22 136/88   07/14/22 144/84   03/08/22 125/82        Physical Exam  Constitutional:       General: She is not in acute distress  Appearance: She is well-developed  Neck:      Thyroid: No thyromegaly  Cardiovascular:      Rate and Rhythm: Normal rate and regular rhythm  Heart sounds: Normal heart sounds  No murmur heard  Pulmonary:      Effort: Pulmonary effort is normal  No respiratory distress  Breath sounds: No wheezing, rhonchi or rales  Chest:      Chest wall: No tenderness  Abdominal:      General: There is no distension  Palpations: Abdomen is soft  There is no mass  Tenderness: There is no abdominal tenderness  Musculoskeletal:      Cervical back: Neck supple  Right lower leg: No edema  Left lower leg: No edema  Lymphadenopathy:      Cervical: No cervical adenopathy  Neurological:      Mental Status: She is alert and oriented to person, place, and time  Psychiatric:         Mood and Affect: Mood normal          Behavior: Behavior normal          Thought Content:  Thought content normal

## 2022-08-23 DIAGNOSIS — Z12.31 ENCOUNTER FOR SCREENING MAMMOGRAM FOR MALIGNANT NEOPLASM OF BREAST: ICD-10-CM

## 2023-01-15 DIAGNOSIS — I10 ESSENTIAL HYPERTENSION: ICD-10-CM

## 2023-01-15 RX ORDER — LOSARTAN POTASSIUM 25 MG/1
25 TABLET ORAL DAILY
Qty: 30 TABLET | Refills: 3 | Status: SHIPPED | OUTPATIENT
Start: 2023-01-15 | End: 2023-02-20 | Stop reason: SDUPTHER

## 2023-01-16 LAB
ALBUMIN SERPL-MCNC: 4.5 G/DL (ref 3.6–5.1)
ALBUMIN/GLOB SERPL: 1.6 (CALC) (ref 1–2.5)
ALP SERPL-CCNC: 64 U/L (ref 37–153)
ALT SERPL-CCNC: 16 U/L (ref 6–29)
AST SERPL-CCNC: 15 U/L (ref 10–35)
BILIRUB SERPL-MCNC: 0.7 MG/DL (ref 0.2–1.2)
BUN SERPL-MCNC: 17 MG/DL (ref 7–25)
BUN/CREAT SERPL: NORMAL (CALC) (ref 6–22)
CALCIUM SERPL-MCNC: 9.6 MG/DL (ref 8.6–10.4)
CHLORIDE SERPL-SCNC: 102 MMOL/L (ref 98–110)
CHOLEST SERPL-MCNC: 197 MG/DL
CHOLEST/HDLC SERPL: 4.6 (CALC)
CO2 SERPL-SCNC: 32 MMOL/L (ref 20–32)
CREAT SERPL-MCNC: 0.6 MG/DL (ref 0.5–1.05)
GFR/BSA.PRED SERPLBLD CYS-BASED-ARV: 102 ML/MIN/1.73M2
GLOBULIN SER CALC-MCNC: 2.9 G/DL (CALC) (ref 1.9–3.7)
GLUCOSE SERPL-MCNC: 93 MG/DL (ref 65–99)
HDLC SERPL-MCNC: 43 MG/DL
LDLC SERPL CALC-MCNC: 124 MG/DL (CALC)
NONHDLC SERPL-MCNC: 154 MG/DL (CALC)
POTASSIUM SERPL-SCNC: 4.1 MMOL/L (ref 3.5–5.3)
PROT SERPL-MCNC: 7.4 G/DL (ref 6.1–8.1)
SODIUM SERPL-SCNC: 141 MMOL/L (ref 135–146)
T4 FREE SERPL-MCNC: 1 NG/DL (ref 0.8–1.8)
TRIGL SERPL-MCNC: 183 MG/DL
TSH SERPL-ACNC: 5.03 MIU/L (ref 0.4–4.5)

## 2023-02-20 ENCOUNTER — OFFICE VISIT (OUTPATIENT)
Dept: FAMILY MEDICINE CLINIC | Facility: CLINIC | Age: 63
End: 2023-02-20

## 2023-02-20 VITALS
WEIGHT: 158.2 LBS | HEART RATE: 105 BPM | RESPIRATION RATE: 16 BRPM | DIASTOLIC BLOOD PRESSURE: 98 MMHG | HEIGHT: 66 IN | BODY MASS INDEX: 25.43 KG/M2 | SYSTOLIC BLOOD PRESSURE: 164 MMHG

## 2023-02-20 DIAGNOSIS — I10 ESSENTIAL HYPERTENSION: Primary | ICD-10-CM

## 2023-02-20 DIAGNOSIS — Z12.11 SCREENING FOR COLON CANCER: ICD-10-CM

## 2023-02-20 DIAGNOSIS — E78.5 HYPERLIPIDEMIA, UNSPECIFIED HYPERLIPIDEMIA TYPE: ICD-10-CM

## 2023-02-20 DIAGNOSIS — E03.8 SUBCLINICAL HYPOTHYROIDISM: ICD-10-CM

## 2023-02-20 RX ORDER — LOSARTAN POTASSIUM 25 MG/1
37.5 TABLET ORAL DAILY
Qty: 45 TABLET | Refills: 6 | Status: SHIPPED | OUTPATIENT
Start: 2023-02-20

## 2023-02-20 NOTE — PROGRESS NOTES
Assessment/Plan:     Diagnoses and all orders for this visit:    Essential hypertension  -  will increase losartan 25 mg to 1 5 mg daily, reviewed low sodium diet and regular exercise, advised to continue home BP monitoring and bring BP log to the next visit  -     losartan (COZAAR) 25 mg tablet; Take 1 5 tablets (37 5 mg total) by mouth daily    Hyperlipidemia, unspecified hyperlipidemia type  - , improved from 179, continue life style changes    Subclinical hypothyroidism  -  TSH 5 03, mildly elevated with normal free T4 1 0 and overall stable, s/s of hypothyroidism reviewed, will continue monitoring and recheck TFT's in 6 months    Screening for colon cancer  -  colonoscopy is due in 2023, GI referral placed  -     Ambulatory Referral to Gastroenterology; Future      Return in 3-4 months or sooner as needed  The patient understands and agrees with the treatment plan  Subjective:   Chief Complaint   Patient presents with   • Hypertension   • Hyperlipidemia      Patient ID: Clarice Villalobos is a 65 y o  female who presents today for follow up visit for hypertension, hyperlipidemia, elevated TSH and review her lab results  Patient offers no complaints at this time, she is compliant with her losartan 25 mg daily which she take 1/2 tablet twice a day, she reports home BP readings in 130's-140's/ 80's  Her BP this am was 148/86         The following portions of the patient's history were reviewed and updated as appropriate: allergies, current medications, past family history, past medical history, past social history, past surgical history and problem list     Past Medical History:   Diagnosis Date   • Anxiety    • Hyperlipidemia    • Hypertension    • Microscopic hematuria    • Subclinical hypothyroidism      Past Surgical History:   Procedure Laterality Date   • CATARACT EXTRACTION Right    •  SECTION     • TONSILLECTOMY     • TOTAL ABDOMINAL HYSTERECTOMY  2010    w/left ovary removal  Last assessed: 5/24/16     Family History   Problem Relation Age of Onset   • Arthritis Mother    • Heart disease Father    • Diabetes Father    • Diabetes type I Son         Mellitus   • Thyroid disease Sister    • Aneurysm Sister    • No Known Problems Sister    • Mental illness Neg Hx    • Substance Abuse Neg Hx    • Alcohol abuse Neg Hx      Social History     Socioeconomic History   • Marital status: /Civil Union     Spouse name: Not on file   • Number of children: Not on file   • Years of education: Not on file   • Highest education level: Not on file   Occupational History   • Not on file   Tobacco Use   • Smoking status: Never   • Smokeless tobacco: Never   Vaping Use   • Vaping Use: Never used   Substance and Sexual Activity   • Alcohol use: No   • Drug use: No   • Sexual activity: Yes     Partners: Male     Birth control/protection: Female Sterilization   Other Topics Concern   • Not on file   Social History Narrative    Always uses seatbelts    Caffeine use: 2 cups coffee/day    Has smoke detectors     Social Determinants of Health     Financial Resource Strain: Not on file   Food Insecurity: Not on file   Transportation Needs: Not on file   Physical Activity: Not on file   Stress: Not on file   Social Connections: Not on file   Intimate Partner Violence: Not on file   Housing Stability: Not on file       Current Outpatient Medications:   •  ketoconazole (NIZORAL) 2 % cream, Apply topically 2 (two) times a day, Disp: 30 g, Rfl: 0  •  losartan (COZAAR) 25 mg tablet, Take 1 5 tablets (37 5 mg total) by mouth daily, Disp: 45 tablet, Rfl: 6  •  rosuvastatin (CRESTOR) 5 mg tablet, Take 1 tablet (5 mg total) by mouth daily after dinner, Disp: 90 tablet, Rfl: 3    Review of Systems   Constitutional: Negative for appetite change, chills, fatigue and fever  Respiratory: Negative for cough, shortness of breath and wheezing  Cardiovascular: Negative for chest pain, palpitations and leg swelling  Gastrointestinal: Negative for abdominal pain, blood in stool, diarrhea, nausea and vomiting  Endocrine: Negative for cold intolerance and heat intolerance  Genitourinary: Negative for dysuria, frequency, hematuria and urgency  Neurological: Negative for dizziness, syncope, weakness, numbness and headaches  Psychiatric/Behavioral: Negative for dysphoric mood and sleep disturbance  The patient is not nervous/anxious            Objective:    Vitals:    02/20/23 1621 02/20/23 1629 02/20/23 1640   BP: (!) 198/120 (!) 182/108 164/98   BP Location:   Left arm   Patient Position:   Sitting   Cuff Size:  Standard Standard   Pulse: 105     Resp: 16     Weight: 71 8 kg (158 lb 3 2 oz)     Height: 5' 5 5" (1 664 m)          Physical Exam     Lab Results   Component Value Date    CHOLESTEROL 197 01/16/2023     Lab Results   Component Value Date    HDL 43 (L) 01/16/2023     Lab Results   Component Value Date    TRIG 183 (H) 01/16/2023     Lab Results   Component Value Date    LDLCALC 124 (H) 01/16/2023     Lab Results   Component Value Date    SODIUM 141 01/16/2023    K 4 1 01/16/2023     01/16/2023    CO2 32 01/16/2023    BUN 17 01/16/2023    CREATININE 0 60 01/16/2023    GLUC 93 01/16/2023    CALCIUM 9 6 01/16/2023    AST 15 01/16/2023    ALT 16 01/16/2023    ALKPHOS 64 01/16/2023    TP 7 4 01/16/2023    TBILI 0 7 01/16/2023    EGFR 102 01/16/2023

## 2023-03-10 ENCOUNTER — TELEPHONE (OUTPATIENT)
Dept: GASTROENTEROLOGY | Facility: CLINIC | Age: 63
End: 2023-03-10

## 2023-03-10 NOTE — TELEPHONE ENCOUNTER
Pt of Dr Jaci Rider - Pt calling to schedule her 10 yr recall colon  Pt would be due following June 28, 2013  Informed pt schedule is not open that far in advance yet  Pt will return call sometime next month to see if schedule is open

## 2023-03-20 DIAGNOSIS — I10 ESSENTIAL HYPERTENSION: ICD-10-CM

## 2023-03-20 RX ORDER — LOSARTAN POTASSIUM 25 MG/1
37.5 TABLET ORAL DAILY
Qty: 45 TABLET | Refills: 6 | Status: SHIPPED | OUTPATIENT
Start: 2023-03-20

## 2023-04-05 ENCOUNTER — TELEPHONE (OUTPATIENT)
Dept: GASTROENTEROLOGY | Facility: AMBULARY SURGERY CENTER | Age: 63
End: 2023-04-05

## 2023-04-05 ENCOUNTER — PREP FOR PROCEDURE (OUTPATIENT)
Dept: GASTROENTEROLOGY | Facility: AMBULARY SURGERY CENTER | Age: 63
End: 2023-04-05

## 2023-04-05 DIAGNOSIS — Z12.11 SCREENING FOR COLON CANCER: Primary | ICD-10-CM

## 2023-04-05 NOTE — TELEPHONE ENCOUNTER
"[facility] Assessment    Name: Maria Esther Hooker  YOB: 1960  Last Height: 5' 5 5\" (1 664 m)  Last weight: 71 8 kg (158 lb 3 2 oz)  BMI: 25 93 kg/m²  Procedure: COLONOSCOPY  Diagnosis: NORMAL  Date of procedure: 06-  Prep:   Responsible : Alicia Colby  Phone#: [phone]  Name completing form: Willhina Artemiomya  Date form completed: 04/05/23      If the patient answers yes to any of these questions, schedule in a hospital  Are you pregnant: No  Do you rely on a wheelchair for mobility: No  Have you been diagnosed with End Stage Renal Disease (ESRD): No  Do you need oxygen during the day: No  Have you had a heart attack or stroke within the past three months: No  Have you had a seizure within the past three months: No  Have you ever been informed by anesthesia that you have a difficult airway: No  Additional Questions  Have you had any cardiac testing or are under the care of a Cardiologist (see cardiac list): No  Cardiac list:   Do you have an implanted cardiac defibrillator: No (Comment:  This patient should be scheduled in the hospital)    Have any bleeding problems, such as anemia or hemophilia (If patient has H&H result below 8, schedule in hospital   H&H must be within 30 days of procedure): No    Had an organ transplant within the past 3 months: No    Do you have any present infections: No  Do you get short of breath when walking a few blocks: No  Have you been diagnosed with diabetes: No  Comments (provide cardiac provider information if applicable):        "

## 2023-04-05 NOTE — TELEPHONE ENCOUNTER
Scheduled date of colonoscopy (as of today): 06-  Physician performing colonoscopy: Balwinder Florez  Location of colonoscopy: [address] [place] [address]  Bowel prep reviewed with patient: to be reviewed by a    Instructions reviewed with patient by: to be reviewed by a    Clearances: n/a

## 2023-04-07 DIAGNOSIS — E78.5 HYPERLIPIDEMIA, UNSPECIFIED HYPERLIPIDEMIA TYPE: ICD-10-CM

## 2023-04-07 RX ORDER — ROSUVASTATIN CALCIUM 5 MG/1
TABLET, COATED ORAL
Qty: 90 TABLET | Refills: 3 | Status: SHIPPED | OUTPATIENT
Start: 2023-04-07

## 2023-05-18 ENCOUNTER — OFFICE VISIT (OUTPATIENT)
Dept: FAMILY MEDICINE CLINIC | Facility: CLINIC | Age: 63
End: 2023-05-18

## 2023-05-18 VITALS
SYSTOLIC BLOOD PRESSURE: 160 MMHG | DIASTOLIC BLOOD PRESSURE: 100 MMHG | OXYGEN SATURATION: 98 % | WEIGHT: 159.4 LBS | BODY MASS INDEX: 25.62 KG/M2 | RESPIRATION RATE: 15 BRPM | HEART RATE: 90 BPM | HEIGHT: 66 IN

## 2023-05-18 DIAGNOSIS — J04.0 ACUTE LARYNGITIS: Primary | ICD-10-CM

## 2023-05-18 LAB — S PYO AG THROAT QL: NEGATIVE

## 2023-05-18 RX ORDER — AMOXICILLIN AND CLAVULANATE POTASSIUM 875; 125 MG/1; MG/1
1 TABLET, FILM COATED ORAL EVERY 12 HOURS SCHEDULED
Qty: 20 TABLET | Refills: 0 | Status: SHIPPED | OUTPATIENT
Start: 2023-05-18 | End: 2023-05-28

## 2023-05-18 NOTE — PROGRESS NOTES
Name: Nick Bradford      : 1960      MRN: [id number]  Encounter Provider: Jorge Boykin MD  Encounter Date: 2023   Encounter department: [facility]  4 2     1  Acute laryngitis  -     POCT rapid strepA    Rapid strep negative in office; covid test at home was negative  Discussed with patient and encouraged voice rest, increase fluid intake  Given the duration of patients symptoms will start on abx regimen with augmentin bid for 10 days  Patient to return to clinic if no improvement or worsening of symptoms  Subjective      Mrs María Uribe is a 65 yr old female who presents today with 3 week h/o laryngitis  Patient reports no significant improvement in symptoms and has tried otc robitussin, sinus care, throat lozenges and advil  Reports subjective fever with some body aches  Did a covid test which was negative  Reports mucous drainage  Denies any vision changes, chest pain, shortness of breath or n/v/d  Review of Systems   Constitutional: Negative for chills and fever  HENT: Positive for congestion, sore throat and voice change  Negative for ear pain, mouth sores and postnasal drip  Eyes: Negative for pain and visual disturbance  Respiratory: Negative for cough, chest tightness, shortness of breath and wheezing  Cardiovascular: Negative for chest pain  Gastrointestinal: Negative for abdominal pain, diarrhea, nausea and vomiting  Genitourinary: Negative for dysuria  Skin: Negative for rash  Neurological: Negative for dizziness and headaches         Current Outpatient Medications on File Prior to Visit   Medication Sig   • ketoconazole (NIZORAL) 2 % cream Apply topically 2 (two) times a day   • losartan (COZAAR) 25 mg tablet Take 1 5 tablets (37 5 mg total) by mouth daily   • rosuvastatin (CRESTOR) 5 mg tablet take 1 tablet by mouth once daily AFTER DINNER       Objective     /100   Pulse 90   Resp 15   Ht 5' "5 58\" (1 666 m)   Wt 72 3 kg (159 lb 6 4 oz)   SpO2 98%   BMI 26 06 kg/m²     Physical Exam  HENT:      Head: Normocephalic and atraumatic  Right Ear: Tympanic membrane and ear canal normal       Left Ear: Tympanic membrane and ear canal normal       Nose: Congestion present  Mouth/Throat:      Mouth: Mucous membranes are moist       Pharynx: No oropharyngeal exudate or posterior oropharyngeal erythema  Eyes:      Extraocular Movements: Extraocular movements intact  Conjunctiva/sclera: Conjunctivae normal       Pupils: Pupils are equal, round, and reactive to light  Cardiovascular:      Rate and Rhythm: Normal rate  Pulses: Normal pulses  Pulmonary:      Effort: Pulmonary effort is normal       Breath sounds: Normal breath sounds  Abdominal:      General: Abdomen is flat  Palpations: Abdomen is soft  Musculoskeletal:      Cervical back: Normal range of motion and neck supple  Lymphadenopathy:      Cervical: No cervical adenopathy  Skin:     General: Skin is warm  Neurological:      Mental Status: She is alert         Ml Green MD  "

## 2023-05-18 NOTE — LETTER
May 18, 2023     Patient: Caio Lewis  YOB: 1960  Date of Visit: 5/18/2023      To Whom it May Concern:    Travon Reyez is under my professional care  Reyna Booker was seen in my office on 5/18/2023  Reyna Booker may return to work on 5/19/2023  If you have any questions or concerns, please don't hesitate to call           Sincerely,          Efren Guevara MD

## 2023-05-23 ENCOUNTER — TELEPHONE (OUTPATIENT)
Dept: FAMILY MEDICINE CLINIC | Facility: CLINIC | Age: 63
End: 2023-05-23

## 2023-05-23 NOTE — TELEPHONE ENCOUNTER
----- Message from Aki Diallo MD sent at 5/23/2023 10:31 AM EDT -----  Please inform patient of negative throat culture result  Thank you!    ----- Message -----  From: Modesto Alicia  Sent: 5/18/2023   8:31 AM EDT  To: Aki Diallo MD

## 2023-06-15 ENCOUNTER — TELEPHONE (OUTPATIENT)
Dept: GASTROENTEROLOGY | Facility: CLINIC | Age: 63
End: 2023-06-15

## 2023-06-15 DIAGNOSIS — Z12.11 SCREENING FOR COLON CANCER: Primary | ICD-10-CM

## 2023-06-15 NOTE — TELEPHONE ENCOUNTER
Procedure confirmed  Colonoscopy     Via: [email or web address]    Instructions given: Email     Prep Given: Golytely    Call the office if there are any questions

## 2023-06-28 ENCOUNTER — ANESTHESIA (OUTPATIENT)
Dept: GASTROENTEROLOGY | Facility: AMBULATORY SURGERY CENTER | Age: 63
End: 2023-06-28

## 2023-06-28 ENCOUNTER — ANESTHESIA EVENT (OUTPATIENT)
Dept: GASTROENTEROLOGY | Facility: AMBULATORY SURGERY CENTER | Age: 63
End: 2023-06-28

## 2023-06-28 ENCOUNTER — HOSPITAL ENCOUNTER (OUTPATIENT)
Dept: GASTROENTEROLOGY | Facility: AMBULATORY SURGERY CENTER | Age: 63
Discharge: HOME | End: 2023-06-28

## 2023-06-28 VITALS
HEIGHT: 65 IN | HEART RATE: 70 BPM | RESPIRATION RATE: 13 BRPM | OXYGEN SATURATION: 97 % | TEMPERATURE: 98.6 F | SYSTOLIC BLOOD PRESSURE: 183 MMHG | DIASTOLIC BLOOD PRESSURE: 83 MMHG | BODY MASS INDEX: 26.33 KG/M2 | WEIGHT: 158 LBS

## 2023-06-28 DIAGNOSIS — Z12.11 SCREENING FOR COLON CANCER: ICD-10-CM

## 2023-06-28 PROCEDURE — 88305 TISSUE EXAM BY PATHOLOGIST: CPT | Performed by: STUDENT IN AN ORGANIZED HEALTH CARE EDUCATION/TRAINING PROGRAM

## 2023-06-28 RX ORDER — SODIUM CHLORIDE, SODIUM LACTATE, POTASSIUM CHLORIDE, CALCIUM CHLORIDE 600; 310; 30; 20 MG/100ML; MG/100ML; MG/100ML; MG/100ML
50 INJECTION, SOLUTION INTRAVENOUS CONTINUOUS
Status: DISCONTINUED | OUTPATIENT
Start: 2023-06-28 | End: 2023-07-02 | Stop reason: HOSPADM

## 2023-06-28 RX ORDER — PROPOFOL 10 MG/ML
INJECTION, EMULSION INTRAVENOUS AS NEEDED
Status: DISCONTINUED | OUTPATIENT
Start: 2023-06-28 | End: 2023-06-28

## 2023-06-28 RX ADMIN — PROPOFOL 100 MG: 10 INJECTION, EMULSION INTRAVENOUS at 07:41

## 2023-06-28 RX ADMIN — SODIUM CHLORIDE, SODIUM LACTATE, POTASSIUM CHLORIDE, CALCIUM CHLORIDE: 600; 310; 30; 20 INJECTION, SOLUTION INTRAVENOUS at 07:06

## 2023-06-28 RX ADMIN — SODIUM CHLORIDE, SODIUM LACTATE, POTASSIUM CHLORIDE, CALCIUM CHLORIDE: 600; 310; 30; 20 INJECTION, SOLUTION INTRAVENOUS at 07:56

## 2023-06-28 RX ADMIN — PROPOFOL 100 MG: 10 INJECTION, EMULSION INTRAVENOUS at 07:50

## 2023-06-28 RX ADMIN — PROPOFOL 100 MG: 10 INJECTION, EMULSION INTRAVENOUS at 07:25

## 2023-06-28 RX ADMIN — PROPOFOL 100 MG: 10 INJECTION, EMULSION INTRAVENOUS at 07:30

## 2023-06-28 NOTE — ANESTHESIA POSTPROCEDURE EVALUATION
Post-Op Assessment Note    CV Status:  Stable  Pain Score: 0    Pain management: adequate     Mental Status:  Alert and awake   Hydration Status:  Euvolemic   PONV Controlled:  Controlled   Airway Patency:  Patent      Post Op Vitals Reviewed: Yes      Staff: CRNA         No notable events documented      BP  149/77   Temp   98   Pulse  72   Resp   16   SpO2   98

## 2023-06-28 NOTE — DISCHARGE INSTRUCTIONS
Colonoscopy   WHAT YOU NEED TO KNOW:   A colonoscopy is a procedure to examine the inside of your colon (intestine) with a scope. Polyps or tissue growths may have been removed during your colonoscopy. It is normal to feel bloated and to have some abdominal discomfort. You should be passing gas. If you have hemorrhoids or you had polyps removed, you may have a small amount of bleeding. DISCHARGE INSTRUCTIONS:   Seek care immediately if:   You have sudden, severe abdominal pain. You have problems swallowing. You have a large amount of black, sticky bowel movements or blood in your bowel movements. You have sudden trouble breathing. You feel weak, lightheaded, or faint or your heart beats faster than normal for you. Contact your healthcare provider if:   You have a fever and chills. You have nausea or are vomiting. Your abdomen is bloated or feels full and hard. You have abdominal pain. You have black, sticky bowel movements or blood in your bowel movements. You have not had a bowel movement for 3 days after your procedure. You have rash or hives. You have questions or concerns about your procedure. Activity:   Do not lift, strain, or run for 24 hours after your procedure. Rest after your procedure. You have been given medicine to relax you. Do not drive or make important decisions until the day after your procedure. Return to your normal activity as directed. Relieve gas and discomfort from bloating by lying on your right side with a heating pad on your abdomen. You may need to take short walks to help the gas move out. Eat small meals until bloating is relieved. Follow up with your healthcare provider as directed: Write down your questions so you remember to ask them during your visits. If you take a “blood thinner”, please review the specific instructions from your endoscopist about when you should resume it.  These can be found in the “Recommendation” and “Your Medication list” sections of this After Visit Summary.

## 2023-06-28 NOTE — H&P
History and Physical - [facility]  Apurva Clemens 65 y.o. female MRN: [id number]    HPI: Apurva Clemens is a 65y.o. year old female who presents for screening colonoscopy. She is average risk. REVIEW OF SYSTEMS: Per the HPI, and otherwise unremarkable. Historical Information   Past Medical History:   Diagnosis Date   • Anxiety    • Hyperlipidemia    • Hypertension    • Microscopic hematuria    • Subclinical hypothyroidism      Past Surgical History:   Procedure Laterality Date   • CATARACT EXTRACTION Right    •  SECTION     • TONSILLECTOMY     • TOTAL ABDOMINAL HYSTERECTOMY  2010    w/left ovary removal. Last assessed: 16     Social History   Social History     Substance and Sexual Activity   Alcohol Use No     Social History     Substance and Sexual Activity   Drug Use No     Social History     Tobacco Use   Smoking Status Never   Smokeless Tobacco Never     Family History   Problem Relation Age of Onset   • Arthritis Mother    • Heart disease Father    • Diabetes Father    • Diabetes type I Son         Mellitus   • Thyroid disease Sister    • Aneurysm Sister    • No Known Problems Sister    • Mental illness Neg Hx    • Substance Abuse Neg Hx    • Alcohol abuse Neg Hx        Meds/Allergies       Current Outpatient Medications:   •  ketoconazole (NIZORAL) 2 % cream  •  losartan (COZAAR) 25 mg tablet  •  rosuvastatin (CRESTOR) 5 mg tablet  •  polyethylene glycol (GOLYTELY) 4000 mL solution    Current Facility-Administered Medications:   •  lactated ringers infusion, 50 mL/hr, Intravenous, Continuous, New Bag at 23 0706    Allergies   Allergen Reactions   • Zithromax [Azithromycin]      Other reaction(s): GI Upset  Category:  Allergy;    • Clindamycin Dizziness and Fatigue   • Codeine Drowsiness       Objective     BP (!) 180/88   Pulse 87   Temp 98.6 °F (37 °C) (Temporal)   Resp 18   Ht 5' 5" (1.651 m)   Wt 71.7 kg (158 lb)   SpO2 98%   BMI 26.29 kg/m² PHYSICAL EXAM    Gen: NAD AAOx3  Head: Normocephalic, Atraumatic  CV: S1S2 RRR no m/r/g  CHEST: Clear b/l no c/r/w  ABD: soft, +BS NT/ND  EXT: no edema    ASSESSMENT/PLAN:  This is a 65y.o. year old female here for screening colonoscopy, and she is stable and optimized for her procedure.

## 2023-06-28 NOTE — PROGRESS NOTES
Pt with continued cramping but feeliing better. Repositioned multiple times and encouraged to move around to break up the gas. Procedure results reviewed with patient by Dr Rehana Shepherd.

## 2023-07-05 PROCEDURE — 88305 TISSUE EXAM BY PATHOLOGIST: CPT | Performed by: STUDENT IN AN ORGANIZED HEALTH CARE EDUCATION/TRAINING PROGRAM

## 2023-07-06 NOTE — RESULT ENCOUNTER NOTE
I called the patient and reviewed pathology results. Patient did have a small adenoma. Change recall from 10 years to 7 years.

## 2023-07-27 ENCOUNTER — ANNUAL EXAM (OUTPATIENT)
Dept: OBGYN CLINIC | Facility: CLINIC | Age: 63
End: 2023-07-27
Payer: COMMERCIAL

## 2023-07-27 VITALS
WEIGHT: 160 LBS | BODY MASS INDEX: 26.66 KG/M2 | DIASTOLIC BLOOD PRESSURE: 90 MMHG | HEIGHT: 65 IN | SYSTOLIC BLOOD PRESSURE: 130 MMHG

## 2023-07-27 DIAGNOSIS — Z01.419 ENCNTR FOR GYN EXAM (GENERAL) (ROUTINE) W/O ABN FINDINGS: Primary | ICD-10-CM

## 2023-07-27 DIAGNOSIS — Z12.31 ENCOUNTER FOR SCREENING MAMMOGRAM FOR MALIGNANT NEOPLASM OF BREAST: ICD-10-CM

## 2023-07-27 PROCEDURE — S0612 ANNUAL GYNECOLOGICAL EXAMINA: HCPCS | Performed by: OBSTETRICS & GYNECOLOGY

## 2023-07-27 NOTE — PROGRESS NOTES
Annual Wellness Visit  [address]   [address] [facility], [address], [facility], [address] [facility]    ASSESSMENT/PLAN: Georgie Wakefield is a 65 y.o.  who presents for annual gynecologic exam.    Encounter for routine gynecologic examination  - Routine well woman exam completed today. - Cervical Cancer Screening: Current ASCCP Guidelines reviewed. Last Pap: Not on file. Next Pap Due: not indicated due to hysterectomy  - Contraceptive counseling discussed. Current contraception: no method, postmenopausal  - Breast Cancer Screening: Last Mammogram 2022  - Colorectal cancer screening last , next due 7 yrs from previous. - The following were reviewed in today's visit: breast self exam    Additional problems addressed during this visit:  1. Encntr for gyn exam (general) (routine) w/o abn findings    2. Encounter for screening mammogram for malignant neoplasm of breast  -     Mammo screening bilateral w 3d & cad; Future        Next visit: 1 yr    CC:  Annual Gynecologic Examination    HPI: Georgie Wakefield is a 65 y.o.  who presents for annual gynecologic examination. Patient presents for Gyn exam.  Has had intermittent hot flashes since discontinuing HRT      Gyn History  No LMP recorded. Patient has had a hysterectomy. Last Pap: Not on file was normal    She  reports being sexually active and has had partner(s) who are male. She reports using the following method of birth control/protection: Female Sterilization. OB History      Past Medical History:  No date:  Anxiety  No date: Hyperlipidemia  No date: Hypertension  No date: Microscopic hematuria  No date: Subclinical hypothyroidism     Past Surgical History:  No date: CATARACT EXTRACTION; Right  1991: [facility]: TONSILLECTOMY  2010: TOTAL ABDOMINAL HYSTERECTOMY      Comment:  w/left ovary removal. Last assessed: 16     Family History   Problem Relation Age of Onset   • Arthritis Mother    • Heart disease Father • Diabetes Father    • Diabetes type I Son         Mellitus   • Thyroid disease Sister    • Aneurysm Sister    • No Known Problems Sister    • Mental illness Neg Hx    • Substance Abuse Neg Hx    • Alcohol abuse Neg Hx         Social History     Tobacco Use   • Smoking status: Never     Passive exposure: Never   • Smokeless tobacco: Never   Vaping Use   • Vaping Use: Never used   Substance Use Topics   • Alcohol use: No   • Drug use: No          Current Outpatient Medications:   •  ketoconazole (NIZORAL) 2 % cream, Apply topically 2 (two) times a day, Disp: 30 g, Rfl: 0  •  losartan (COZAAR) 25 mg tablet, Take 1.5 tablets (37.5 mg total) by mouth daily, Disp: 45 tablet, Rfl: 6  •  rosuvastatin (CRESTOR) 5 mg tablet, take 1 tablet by mouth once daily AFTER DINNER, Disp: 90 tablet, Rfl: 3    She is allergic to zithromax [azithromycin], clindamycin, and codeine. .    ROS negative except as noted in HPI    Objective:  /90 (BP Location: Left arm, Patient Position: Sitting, Cuff Size: Standard)   Ht 5' 5" (1.651 m)   Wt 72.6 kg (160 lb)   BMI 26.63 kg/m²      Physical Exam  Vitals and nursing note reviewed. HENT:      Head: Normocephalic. Chest:   Breasts:     Breasts are symmetrical.      Right: Normal. No bleeding, mass, nipple discharge, skin change or tenderness. Left: Normal. No bleeding, mass, nipple discharge, skin change or tenderness. Abdominal:      General: There is no distension. Palpations: Abdomen is soft. There is no mass. Tenderness: There is no abdominal tenderness. There is no rebound. Genitourinary:     General: Normal vulva. Exam position: Lithotomy position. Labia:         Right: No rash, tenderness or lesion. Left: No rash, tenderness or lesion. Urethra: No urethral pain or urethral lesion. Vagina: Normal. No vaginal discharge. Adnexa: Right adnexa normal and left adnexa normal.      Rectum: No external hemorrhoid.       Comments: Cervix/uterus surgically absent  Musculoskeletal:      Right lower leg: No edema. Left lower leg: No edema. Lymphadenopathy:      Upper Body:      Right upper body: No axillary or pectoral adenopathy. Left upper body: No axillary or pectoral adenopathy. Skin:     General: Skin is warm. Neurological:      Mental Status: She is alert and oriented to person, place, and time. Psychiatric:         Mood and Affect: Mood normal.         Behavior: Behavior normal.         Thought Content:  Thought content normal.

## 2023-08-02 ENCOUNTER — OFFICE VISIT (OUTPATIENT)
Dept: FAMILY MEDICINE CLINIC | Facility: CLINIC | Age: 63
End: 2023-08-02
Payer: COMMERCIAL

## 2023-08-02 VITALS
SYSTOLIC BLOOD PRESSURE: 138 MMHG | RESPIRATION RATE: 16 BRPM | HEIGHT: 65 IN | BODY MASS INDEX: 27.52 KG/M2 | OXYGEN SATURATION: 98 % | WEIGHT: 165.2 LBS | TEMPERATURE: 97.6 F | HEART RATE: 85 BPM | DIASTOLIC BLOOD PRESSURE: 80 MMHG

## 2023-08-02 DIAGNOSIS — E78.5 HYPERLIPIDEMIA, UNSPECIFIED HYPERLIPIDEMIA TYPE: ICD-10-CM

## 2023-08-02 DIAGNOSIS — Z13.228 SCREENING FOR METABOLIC DISORDER: ICD-10-CM

## 2023-08-02 DIAGNOSIS — L25.5 CONTACT DERMATITIS DUE TO PLANT: ICD-10-CM

## 2023-08-02 DIAGNOSIS — Z13.1 SCREENING FOR DIABETES MELLITUS: ICD-10-CM

## 2023-08-02 DIAGNOSIS — Z00.00 ANNUAL PHYSICAL EXAM: Primary | ICD-10-CM

## 2023-08-02 DIAGNOSIS — I10 ESSENTIAL HYPERTENSION: ICD-10-CM

## 2023-08-02 DIAGNOSIS — B35.3 TINEA PEDIS OF RIGHT FOOT: ICD-10-CM

## 2023-08-02 DIAGNOSIS — Z13.0 SCREENING FOR BLOOD DISEASE: ICD-10-CM

## 2023-08-02 DIAGNOSIS — Z13.29 SCREENING FOR THYROID DISORDER: ICD-10-CM

## 2023-08-02 DIAGNOSIS — Z13.220 SCREENING FOR CHOLESTEROL LEVEL: ICD-10-CM

## 2023-08-02 PROCEDURE — 99396 PREV VISIT EST AGE 40-64: CPT | Performed by: FAMILY MEDICINE

## 2023-08-02 PROCEDURE — 99213 OFFICE O/P EST LOW 20 MIN: CPT | Performed by: FAMILY MEDICINE

## 2023-08-02 RX ORDER — KETOCONAZOLE 20 MG/G
CREAM TOPICAL 2 TIMES DAILY
Qty: 30 G | Refills: 2 | Status: SHIPPED | OUTPATIENT
Start: 2023-08-02

## 2023-08-02 RX ORDER — TRIAMCINOLONE ACETONIDE 1 MG/G
CREAM TOPICAL 2 TIMES DAILY
Qty: 30 G | Refills: 0 | Status: SHIPPED | OUTPATIENT
Start: 2023-08-02

## 2023-08-02 NOTE — PATIENT INSTRUCTIONS
SARBJIT ALEX  : 1988  ACCOUNT:  [id number]  [id number]  PCP: Dr. Heidi Mccord     TODAY'S DATE: 2018  DICTATED BY:  [Dr. Ck Trevizo]      CHIEF COMPLAINT: [consult.]    HPI:    [On 2018, Sarbjit Alex, a 30 year old female, presented with dizziness.]    30-year-old female presents for initial visit.  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  Drinks 2 cups of coffee daily.  No palpitations or chest pain reported no nausea vomiting or diaphoresis.  Saw her primary care physician recently and was found to have low heart rate 41 bpm in the office and was referred for cardiac evaluation.  Otherwise goes for walks every day without difficulties does not exercise on a regular basis.  No orthopnea nausea vomiting reported no fevers or chills.  Is scheduled for an outpatient echocardiogram which has not been done yet.        RISK FACTORS:    REVIEW OF SYSTEMS:    CONS: no fever, chills, or recent weight changes. EYES: denies significant visual changes. ENMT: denies difficulties with hearing, otherwise negative. CV: see HPI; denies claudication. RESP: denies chronic cough, hemoptysis. GI: denies melena, hematochezia. : no hematuria. INTEG: no new rashes, lesions. MS: no limiting arthritis. NEURO: lightheaded,dizziness. HEM/LYMPH: denies easy bruising. ALL: no new food or enviornmental allergies.      PAST HISTORY: former smoker,thyroid and none    PAST CV HISTORY:     FAMILY HISTORY: Negative for premature CAD.  SOCIAL HISTORY: SMOKING: Former tobacco use. used to smoke but quit. CAFFEINE: 2 cups daily. ALCOHOL: drinks socially. EXERCISE: walks. DIET: no special diet. MARITAL STATUS: . LIFESTYLE: moderate stress lifestyle. SEXUAL: did not discuss sexual history. EDUCATION: high school graduate. OCCUPATION: stylist. RESIDENCE: lives at home with family. ILLICIT DRUG USE: denies use of street drugs.     ALLERGIES: No Known Allergies    MEDICATIONS: Selected  Wellness Visit for Adults   AMBULATORY CARE:   A wellness visit  is when you see your healthcare provider to get screened for health problems. Your healthcare provider will also give you advice on how to stay healthy. Write down your questions so you remember to ask them. Ask your healthcare provider how often you should have a wellness visit. What happens at a wellness visit:  Your healthcare provider will ask about your health, and your family history of health problems. This includes high blood pressure, heart disease, and cancer. He or she will ask if you have symptoms that concern you, if you smoke, and about your mood. You may also be asked about your intake of medicines, supplements, food, and alcohol. Any of the following may be done:  • Your weight  will be checked. Your height may also be checked so your body mass index (BMI) can be calculated. Your BMI shows if you are at a healthy weight. • Your blood pressure  and heart rate will be checked. Your temperature may also be checked. • Blood and urine tests  may be done. Blood tests may be done to check your cholesterol levels. Abnormal cholesterol levels increase your risk for heart disease and stroke. You may also need a blood or urine test to check for diabetes if you are at increased risk. Urine tests may be done to look for signs of an infection or kidney disease. • A physical exam  includes checking your heartbeat and lungs with a stethoscope. Your healthcare provider may also check your skin to look for sun damage. • Screening tests  may be recommended. A screening test is done to check for diseases that may not cause symptoms. The screening tests you may need depend on your age, gender, family history, and lifestyle habits. For example, colorectal screening may be recommended if you are 48years old or older. Screening tests you need if you are a woman:   • A Pap smear  is used to screen for cervical cancer.  Pap smears are usually done every 3 to 5 years depending on your age. You may need them more often if you have had abnormal Pap smear test results in the past. Ask your healthcare provider how often you should have a Pap smear. • A mammogram  is an x-ray of your breasts to screen for breast cancer. Experts recommend mammograms every 2 years starting at age 48 years. You may need a mammogram at age 52 years or younger if you have an increased risk for breast cancer. Talk to your healthcare provider about when you should start having mammograms and how often you need them. Vaccines you may need:   • Get an influenza vaccine  every year. The influenza vaccine protects you from the flu. Several types of viruses cause the flu. The viruses change over time, so new vaccines are made each year. • Get a tetanus-diphtheria (Td) booster vaccine  every 10 years. This vaccine protects you against tetanus and diphtheria. Tetanus is a severe infection that may cause painful muscle spasms and lockjaw. Diphtheria is a severe bacterial infection that causes a thick covering in the back of your mouth and throat. • Get a human papillomavirus (HPV) vaccine  if you are female and aged 23 to 32 or male 23 to 24 and never received it. This vaccine protects you from HPV infection. HPV is the most common infection spread by sexual contact. HPV may also cause vaginal, penile, and anal cancers. • Get a pneumococcal vaccine  if you are aged 72 years or older. The pneumococcal vaccine is an injection given to protect you from pneumococcal disease. Pneumococcal disease is an infection caused by pneumococcal bacteria. The infection may cause pneumonia, meningitis, or an ear infection. • Get a shingles vaccine  if you are 60 or older, even if you have had shingles before. The shingles vaccine is an injection to protect you from the varicella-zoster virus. This is the same virus that causes chickenpox.  Shingles is a painful rash that develops in people prescriptions see below    VITAL SIGNS: [B/P - 122/70 , Pulse - 60, Weight -  165, Height -   69 , BMI - 24.4 ]    CONS: well developed, well nourished. WEIGHT: BMI parameters reviewed and discussed. HEAD/FACE: no trauma and normocephalic. EYES: conjunctivae not injected and no xanthelasma. NECK: jugular venous pressure not elevated. RESP: respirations with normal rate and rhythm, clear to auscultation. GI: no masses, tenderness or hepatosplenomegaly, rectal deferred. MS: adequate gait for exercise/testing. EXT: no clubbing or cyanosis.  SKIN: no rashes, lesions, ulcers.  NEURO/PSYCH: alert and oriented to time, place and person and normal affect.      CV: PALP: PMI not displaced, no lifts and thrills or rub. AUSC:  regular rhythm, normal S1, S2 without S3; no pathologic murmurs. CAROTIDS: carotid pulses normal. ABD AORTA: abdominal aorta not enlarged. FEM: femoral pulses intact. PEDAL: pedal pulses intact. EXT: no peripheral edema.     LABORATORY DATA: [00]    DECISION MAKING:    Problem #1 bradycardia  Heart rate 65 bpm on the EKG today otherwise normal study.  Patient is reassured.  Will place 24-hour Holter monitor to look for any evidence of recurrence of bradycardia.  In addition to that get 2D echocardiogram to rule out structural heart abnormalities.  If above studies are normal no further workup needed.  Advised the patient to increase salt intake and stand up slowly from a sitting position.  Stay hydrated as much as possible.    ASSESSMENT:  1. Bradycardia  2. Dizziness      PLAN:  [Schedule 2D echocardiogram and wear a 24-hour Holter monitor.  Thank you Dr. Mccord.]    PRESCRIPTIONS:   11/02/18 ALPRAZolam            0.25MG    as needed                                11/02/18 FLUoxetine HCl        40MG      one daily                                11/02/18 Levothyroxine Sodium  125MCG    one daily                                         who had chickenpox or have been exposed to the virus. How to eat healthy:  My Plate is a model for planning healthy meals. It shows the types and amounts of foods that should go on your plate. Fruits and vegetables make up about half of your plate, and grains and protein make up the other half. A serving of dairy is included on the side of your plate. The amount of calories and serving sizes you need depends on your age, gender, weight, and height. Examples of healthy foods are listed below:  • Eat a variety of vegetables  such as dark green, red, and orange vegetables. You can also include canned vegetables low in sodium (salt) and frozen vegetables without added butter or sauces. • Eat a variety of fresh fruits , canned fruit in 100% juice, frozen fruit, and dried fruit. • Include whole grains. At least half of the grains you eat should be whole grains. Examples include whole-wheat bread, wheat pasta, brown rice, and whole-grain cereals such as oatmeal.    • Eat a variety of protein foods such as seafood (fish and shellfish), lean meat, and poultry without skin (turkey and chicken). Examples of lean meats include pork leg, shoulder, or tenderloin, and beef round, sirloin, tenderloin, and extra lean ground beef. Other protein foods include eggs and egg substitutes, beans, peas, soy products, nuts, and seeds. • Choose low-fat dairy products such as skim or 1% milk or low-fat yogurt, cheese, and cottage cheese. • Limit unhealthy fats  such as butter, hard margarine, and shortening. Exercise:  Exercise at least 30 minutes per day on most days of the week. Some examples of exercise include walking, biking, dancing, and swimming. You can also fit in more physical activity by taking the stairs instead of the elevator or parking farther away from stores. Include muscle strengthening activities 2 days each week. Regular exercise provides many health benefits.  It helps you manage your weight, and decreases your risk for type 2 diabetes, heart disease, stroke, and high blood pressure. Exercise can also help improve your mood. Ask your healthcare provider about the best exercise plan for you. General health and safety guidelines:   • Do not smoke. Nicotine and other chemicals in cigarettes and cigars can cause lung damage. Ask your healthcare provider for information if you currently smoke and need help to quit. E-cigarettes or smokeless tobacco still contain nicotine. Talk to your healthcare provider before you use these products. • Limit alcohol. A drink of alcohol is 12 ounces of beer, 5 ounces of wine, or 1½ ounces of liquor. • Lose weight, if needed. Being overweight increases your risk of certain health conditions. These include heart disease, high blood pressure, type 2 diabetes, and certain types of cancer. • Protect your skin. Do not sunbathe or use tanning beds. Use sunscreen with a SPF 15 or higher. Apply sunscreen at least 15 minutes before you go outside. Reapply sunscreen every 2 hours. Wear protective clothing, hats, and sunglasses when you are outside. • Drive safely. Always wear your seatbelt. Make sure everyone in your car wears a seatbelt. A seatbelt can save your life if you are in an accident. Do not use your cell phone when you are driving. This could distract you and cause an accident. Pull over if you need to make a call or send a text message. • Practice safe sex. Use latex condoms if are sexually active and have more than one partner. Your healthcare provider may recommend screening tests for sexually transmitted infections (STIs). • Wear helmets, lifejackets, and protective gear. Always wear a helmet when you ride a bike or motorcycle, go skiing, or play sports that could cause a head injury. Wear protective equipment when you play sports. Wear a lifejacket when you are on a boat or doing water sports.     © Copyright [employer] 2022 Information is for End User's use only and may not be sold, redistributed or otherwise used for commercial purposes. The above information is an  only. It is not intended as medical advice for individual conditions or treatments. Talk to your doctor, nurse or pharmacist before following any medical regimen to see if it is safe and effective for you.

## 2023-08-02 NOTE — PROGRESS NOTES
ADULT ANNUAL PHYSICAL  [id number] [facility]    NAME: Ham Older  AGE: 65 y.o. SEX: female  : 1960     DATE: 2023     Assessment and Plan:     Annual physical exam      Immunizations and preventive care screenings were discussed with patient today. Appropriate education was printed on patient's after visit summary. Counseling:  Alcohol/drug use: discussed moderation in alcohol intake, the recommendations for healthy alcohol use, and avoidance of illicit drug use. Dental Health: discussed importance of regular tooth brushing, flossing, and dental visits. Injury prevention: discussed safety/seat belts, safety helmets, smoke detectors, carbon dioxide detectors, and smoking near bedding or upholstery. · Exercise: the importance of regular exercise/physical activity was discussed. Recommend exercise 3-5 times per week for at least 30 minutes. Return in about 6 months (around 2024) for Recheck. Chief Complaint:     Chief Complaint   Patient presents with   • Follow-up   • Hypertension   • Physical Exam      History of Present Illness:     Adult Annual Physical   Patient here for a comprehensive physical exam.     Diet and Physical Activity  · Diet/Nutrition: well balanced diet. · Exercise: walking. Depression Screening  PHQ-2/9 Depression Screening    Little interest or pleasure in doing things: 0 - not at all  Feeling down, depressed, or hopeless: 0 - not at all  PHQ-2 Score: 0  PHQ-2 Interpretation: Negative depression screen       General Health  · Sleep: sleeps well and gets 7-8 hours of sleep on average. · Hearing: no issues. · Vision: goes for regular eye exams. · Dental: regular dental visits. /GYN Health  · Patient is: postmenopausal     Review of Systems:     Review of Systems   Constitutional: Negative for appetite change, chills, fatigue and fever.    HENT: Negative for congestion, ear pain, hearing loss and sore throat. Eyes: Negative for pain, discharge, redness, itching and visual disturbance. Respiratory: Negative for cough, shortness of breath and wheezing. Cardiovascular: Negative for chest pain, palpitations and leg swelling. Gastrointestinal: Negative for abdominal pain, blood in stool, diarrhea, nausea and vomiting. Endocrine: Negative for cold intolerance, heat intolerance, polydipsia and polyuria. Genitourinary: Negative for dysuria, frequency, hematuria, urgency, vaginal bleeding and vaginal discharge. Neurological: Negative for dizziness, syncope, weakness, numbness and headaches. Psychiatric/Behavioral: Negative for dysphoric mood and sleep disturbance. The patient is not nervous/anxious.        Past Medical History:     Past Medical History:   Diagnosis Date   • Anxiety    • Hyperlipidemia    • Hypertension    • Microscopic hematuria    • Subclinical hypothyroidism       Past Surgical History:     Past Surgical History:   Procedure Laterality Date   • CATARACT EXTRACTION Right    •  SECTION     • TONSILLECTOMY     • TOTAL ABDOMINAL HYSTERECTOMY  2010    w/left ovary removal. Last assessed: 17      Social History:     Social History     Socioeconomic History   • Marital status: /Civil Union     Spouse name: None   • Number of children: None   • Years of education: None   • Highest education level: None   Occupational History   • None   Tobacco Use   • Smoking status: Never     Passive exposure: Never   • Smokeless tobacco: Never   Vaping Use   • Vaping Use: Never used   Substance and Sexual Activity   • Alcohol use: No   • Drug use: No   • Sexual activity: Yes     Partners: Male     Birth control/protection: Female Sterilization   Other Topics Concern   • None   Social History Narrative    Always uses seatbelts    Caffeine use: 2 cups coffee/day    Has smoke detectors     Social Determinants of Health     Financial Resource Strain: Not on file   Food Insecurity: Not on file   Transportation Needs: Not on file   Physical Activity: Not on file   Stress: Not on file   Social Connections: Not on file   Intimate Partner Violence: Not on file   Housing Stability: Not on file      Family History:     Family History   Problem Relation Age of Onset   • Arthritis Mother    • Heart disease Father    • Diabetes Father    • Diabetes type I Son         Mellitus   • Thyroid disease Sister    • Aneurysm Sister    • No Known Problems Sister    • Mental illness Neg Hx    • Substance Abuse Neg Hx    • Alcohol abuse Neg Hx       Current Medications:     Current Outpatient Medications   Medication Sig Dispense Refill   • ketoconazole (NIZORAL) 2 % cream Apply topically 2 (two) times a day 30 g 2   • losartan (COZAAR) 25 mg tablet Take 1.5 tablets (37.5 mg total) by mouth daily 45 tablet 6   • rosuvastatin (CRESTOR) 5 mg tablet take 1 tablet by mouth once daily AFTER DINNER 90 tablet 3   • triamcinolone (KENALOG) 0.1 % cream Apply topically 2 (two) times a day 30 g 0     No current facility-administered medications for this visit. Allergies: Allergies   Allergen Reactions   • Zithromax [Azithromycin]      Other reaction(s): GI Upset  Category: Allergy;    • Clindamycin Dizziness and Fatigue   • Codeine Drowsiness      Physical Exam:     /80   Pulse 85   Temp 97.6 °F (36.4 °C)   Resp 16   Ht 5' 5" (1.651 m)   Wt 74.9 kg (165 lb 3.2 oz)   SpO2 98%   BMI 27.49 kg/m²     Physical Exam  Constitutional:       General: She is not in acute distress. Appearance: She is well-developed. HENT:      Head: Normocephalic and atraumatic. Right Ear: Tympanic membrane, ear canal and external ear normal.      Left Ear: Tympanic membrane, ear canal and external ear normal.      Mouth/Throat:      Mouth: Mucous membranes are moist.      Pharynx: Oropharynx is clear. Eyes:      General: No scleral icterus.      Extraocular Movements: Extraocular movements intact. Conjunctiva/sclera: Conjunctivae normal.      Pupils: Pupils are equal, round, and reactive to light. Neck:      Thyroid: No thyromegaly. Vascular: No JVD. Cardiovascular:      Rate and Rhythm: Normal rate and regular rhythm. Heart sounds: Normal heart sounds. No murmur heard. Pulmonary:      Effort: Pulmonary effort is normal. No respiratory distress. Breath sounds: Normal breath sounds. No wheezing, rhonchi or rales. Abdominal:      General: Bowel sounds are normal. There is no distension. Palpations: Abdomen is soft. There is no mass. Tenderness: There is no abdominal tenderness. Musculoskeletal:      Cervical back: Neck supple. Right lower leg: No edema. Left lower leg: No edema. Lymphadenopathy:      Cervical: No cervical adenopathy. Skin:     Findings: No rash. Neurological:      General: No focal deficit present. Mental Status: She is alert and oriented to person, place, and time. Cranial Nerves: No cranial nerve deficit. Psychiatric:         Mood and Affect: Mood normal.         Behavior: Behavior normal.         Thought Content:  Thought content normal.         Judgment: Judgment normal.        Charity Santos MD  [facility]

## 2023-08-02 NOTE — PROGRESS NOTES
Assessment/Plan:    Essential hypertension  - well controlled, continue losartan, will recheck lab work prior to next visit  - Comprehensive metabolic panel; Future  - CBC and differential; Future  - TSH, 3rd generation with Free T4 reflex; Future    Hyperlipidemia  - continue life style changes, will recheck lipids  - Lipid Panel with Direct LDL reflex; Future    Contact dermatitis due to plant  - advised to apply triamcinolone cream twice a day to affected areas and follow up in the office if symptoms  persist or worsen  - triamcinolone (KENALOG) 0.1 % cream; Apply topically 2 (two) times a day  Dispense: 30 g; Refill: 0    Tinea pedis of right foot  - ketoconazole (NIZORAL) 2 % cream; Apply topically 2 (two) times a day  Dispense: 30 g; Refill: 2         Return in 6 months or sooner as needed. The treatment plan and possible side effects of new medications were reviewed with the patient today. The patient understands and agrees with the treatment plan. Subjective:   Chief Complaint   Patient presents with   • Follow-up   • Hypertension   • Physical Exam      Patient ID: Francesco Crandall is a 65 y.o. female who presents today for follow up hypertension. Patient reports itchy rash on her right leg after doing yard work, she tried OTC topical medications without significant improvement. Patient also requesting a refill on ketoconazole cream for recurrent right foot rash. No other complaints.        The following portions of the patient's history were reviewed and updated as appropriate: allergies, current medications, past family history, past medical history, past social history, past surgical history and problem list.    Past Medical History:   Diagnosis Date   • Anxiety    • Hyperlipidemia    • Hypertension    • Microscopic hematuria    • Subclinical hypothyroidism      Past Surgical History:   Procedure Laterality Date   • CATARACT EXTRACTION Right    • [address]   • TOTAL ABDOMINAL HYSTERECTOMY  06/29/2010    w/left ovary removal. Last assessed: 5/24/16     Family History   Problem Relation Age of Onset   • Arthritis Mother    • Heart disease Father    • Diabetes Father    • Diabetes type I Son         Mellitus   • Thyroid disease Sister    • Aneurysm Sister    • No Known Problems Sister    • Mental illness Neg Hx    • Substance Abuse Neg Hx    • Alcohol abuse Neg Hx      Social History     Socioeconomic History   • Marital status: /Civil Union     Spouse name: Not on file   • Number of children: Not on file   • Years of education: Not on file   • Highest education level: Not on file   Occupational History   • Not on file   Tobacco Use   • Smoking status: Never     Passive exposure: Never   • Smokeless tobacco: Never   Vaping Use   • Vaping Use: Never used   Substance and Sexual Activity   • Alcohol use: No   • Drug use: No   • Sexual activity: Yes     Partners: Male     Birth control/protection: Female Sterilization   Other Topics Concern   • Not on file   Social History Narrative    Always uses seatbelts    Caffeine use: 2 cups coffee/day    Has smoke detectors     Social Determinants of Health     Financial Resource Strain: Not on file   Food Insecurity: Not on file   Transportation Needs: Not on file   Physical Activity: Not on file   Stress: Not on file   Social Connections: Not on file   Intimate Partner Violence: Not on file   Housing Stability: Not on file       Current Outpatient Medications:   •  ketoconazole (NIZORAL) 2 % cream, Apply topically 2 (two) times a day, Disp: 30 g, Rfl: 2  •  losartan (COZAAR) 25 mg tablet, Take 1.5 tablets (37.5 mg total) by mouth daily, Disp: 45 tablet, Rfl: 6  •  rosuvastatin (CRESTOR) 5 mg tablet, take 1 tablet by mouth once daily AFTER DINNER, Disp: 90 tablet, Rfl: 3  •  triamcinolone (KENALOG) 0.1 % cream, Apply topically 2 (two) times a day, Disp: 30 g, Rfl: 0    Review of Systems   Constitutional: Negative for appetite change, chills, fatigue and fever. Respiratory: Negative for cough, shortness of breath and wheezing. Cardiovascular: Negative for chest pain, palpitations and leg swelling. Gastrointestinal: Negative for abdominal pain, blood in stool, diarrhea, nausea and vomiting. Genitourinary: Negative for dysuria, frequency, hematuria and urgency. Skin: Positive for rash. Neurological: Negative for dizziness, syncope, weakness, numbness and headaches. Psychiatric/Behavioral: Negative for dysphoric mood and sleep disturbance. The patient is not nervous/anxious. Objective:    Vitals:    08/02/23 0901   BP: 138/80   Pulse: 85   Resp: 16   Temp: 97.6 °F (36.4 °C)   SpO2: 98%   Weight: 74.9 kg (165 lb 3.2 oz)   Height: 5' 5" (1.651 m)        Physical Exam  Constitutional:       General: She is not in acute distress. Appearance: She is well-developed. Neck:      Thyroid: No thyromegaly. Cardiovascular:      Rate and Rhythm: Normal rate and regular rhythm. Heart sounds: Normal heart sounds. No murmur heard. Pulmonary:      Effort: Pulmonary effort is normal. No respiratory distress. Breath sounds: No wheezing, rhonchi or rales. Chest:      Chest wall: No tenderness. Abdominal:      General: There is no distension. Palpations: Abdomen is soft. There is no mass. Tenderness: There is no abdominal tenderness. Musculoskeletal:      Cervical back: Neck supple. Right lower leg: No edema. Left lower leg: No edema. Lymphadenopathy:      Cervical: No cervical adenopathy. Skin:     Comments: Right distal thigh and medial aspect knee with scattered erythematous papules and vesicles   Neurological:      Mental Status: She is alert and oriented to person, place, and time. Psychiatric:         Mood and Affect: Mood normal.         Behavior: Behavior normal.         Thought Content:  Thought content normal.          Lab Results   Component Value Date    SODIUM 141 01/16/2023    K 4.1 01/16/2023     01/16/2023    CO2 32 01/16/2023    BUN 17 01/16/2023    CREATININE 0.60 01/16/2023    GLUC 93 01/16/2023    CALCIUM 9.6 01/16/2023    AST 15 01/16/2023    ALT 16 01/16/2023    ALKPHOS 64 01/16/2023    TP 7.4 01/16/2023    TBILI 0.7 01/16/2023    EGFR 102 01/16/2023     Lab Results   Component Value Date    CHOLESTEROL 197 01/16/2023     Lab Results   Component Value Date    HDL 43 (L) 01/16/2023     Lab Results   Component Value Date    TRIG 183 (H) 01/16/2023     Lab Results   Component Value Date    LDLCALC 124 (H) 01/16/2023

## 2023-09-05 DIAGNOSIS — Z12.31 ENCOUNTER FOR SCREENING MAMMOGRAM FOR MALIGNANT NEOPLASM OF BREAST: ICD-10-CM

## 2023-10-27 DIAGNOSIS — I10 ESSENTIAL HYPERTENSION: ICD-10-CM

## 2023-10-27 RX ORDER — LOSARTAN POTASSIUM 25 MG/1
37.5 TABLET ORAL DAILY
Qty: 45 TABLET | Refills: 6 | Status: SHIPPED | OUTPATIENT
Start: 2023-10-27

## 2024-02-09 ENCOUNTER — TELEPHONE (OUTPATIENT)
Dept: FAMILY MEDICINE CLINIC | Facility: CLINIC | Age: 64
End: 2024-02-09

## 2024-02-09 ENCOUNTER — OFFICE VISIT (OUTPATIENT)
Dept: FAMILY MEDICINE CLINIC | Facility: CLINIC | Age: 64
End: 2024-02-09
Payer: COMMERCIAL

## 2024-02-09 VITALS
TEMPERATURE: 97.8 F | HEIGHT: 65 IN | RESPIRATION RATE: 15 BRPM | BODY MASS INDEX: 27.39 KG/M2 | WEIGHT: 164.4 LBS | OXYGEN SATURATION: 98 % | DIASTOLIC BLOOD PRESSURE: 88 MMHG | HEART RATE: 75 BPM | SYSTOLIC BLOOD PRESSURE: 124 MMHG

## 2024-02-09 DIAGNOSIS — I10 ESSENTIAL HYPERTENSION: ICD-10-CM

## 2024-02-09 DIAGNOSIS — Z13.1 SCREENING FOR DIABETES MELLITUS: ICD-10-CM

## 2024-02-09 DIAGNOSIS — Z13.0 SCREENING FOR BLOOD DISEASE: ICD-10-CM

## 2024-02-09 DIAGNOSIS — Z13.29 SCREENING FOR THYROID DISORDER: ICD-10-CM

## 2024-02-09 DIAGNOSIS — Z13.228 SCREENING FOR METABOLIC DISORDER: ICD-10-CM

## 2024-02-09 DIAGNOSIS — H10.32 ACUTE CONJUNCTIVITIS OF LEFT EYE, UNSPECIFIED ACUTE CONJUNCTIVITIS TYPE: ICD-10-CM

## 2024-02-09 DIAGNOSIS — J01.90 ACUTE NON-RECURRENT SINUSITIS, UNSPECIFIED LOCATION: Primary | ICD-10-CM

## 2024-02-09 DIAGNOSIS — Z13.220 SCREENING FOR CHOLESTEROL LEVEL: ICD-10-CM

## 2024-02-09 PROCEDURE — 99214 OFFICE O/P EST MOD 30 MIN: CPT | Performed by: FAMILY MEDICINE

## 2024-02-09 RX ORDER — PREDNISONE 10 MG/1
TABLET ORAL
Qty: 20 TABLET | Refills: 0 | Status: SHIPPED | OUTPATIENT
Start: 2024-02-09

## 2024-02-09 RX ORDER — TOBRAMYCIN 3 MG/ML
2 SOLUTION/ DROPS OPHTHALMIC
Qty: 5 ML | Refills: 0 | Status: SHIPPED | OUTPATIENT
Start: 2024-02-09

## 2024-02-09 RX ORDER — AMOXICILLIN AND CLAVULANATE POTASSIUM 875; 125 MG/1; MG/1
1 TABLET, FILM COATED ORAL 2 TIMES DAILY WITH MEALS
Qty: 20 TABLET | Refills: 0 | Status: SHIPPED | OUTPATIENT
Start: 2024-02-09 | End: 2024-02-19

## 2024-02-09 RX ORDER — LOSARTAN POTASSIUM 25 MG/1
25 TABLET ORAL DAILY
Qty: 30 TABLET | Refills: 3 | Status: SHIPPED | OUTPATIENT
Start: 2024-02-09

## 2024-02-09 NOTE — TELEPHONE ENCOUNTER
Patient went to the pharmacy and picked up her medication and there wasn't anything for pink eye.  She said that you two talked about it during her visit today with you.  Could you call in something for her pink eye?

## 2024-02-09 NOTE — PROGRESS NOTES
Assessment/Plan:    Acute non-recurrent sinusitis  - start Augmentin and prednisone taper, advised voice rest and plenty of fluids, encouraged to contact the office for persistent or worsening symptoms  - amoxicillin-clavulanate (AUGMENTIN) 875-125 mg per tablet; Take 1 tablet by mouth 2 (two) times a day with meals for 10 days  Dispense: 20 tablet; Refill: 0  - predniSONE 10 mg tablet; Take 4 tablets daily with food for 2 days, 3 tablets daily for 2 days, 2 tablets daily for 2 days, 1 tablet daily for 2 days  Dispense: 20 tablet; Refill: 0    Acute conjunctivitis of left eye   - start tobramycin eye drops, discussed eye hygiene  - tobramycin (TOBREX) 0.3 % SOLN; Administer 2 drops into the left eye every 4 (four) hours while awake  Dispense: 5 mL; Refill: 0    Essential hypertension  - stable on losartan, will check lab work prior to next visit  - losartan (COZAAR) 25 mg tablet; Take 1 tablet (25 mg total) by mouth daily  Dispense: 30 tablet; Refill: 3  - Lipid Panel with Direct LDL reflex; Future  - Comprehensive metabolic panel; Future  - CBC and differential; Future  - TSH, 3rd generation with Free T4 reflex; Future         Return as scheduled or sooner as needed. The treatment plan and possible side effects of new medications were reviewed with the patient today. The patient understands and agrees with the treatment plan.      Subjective:   Chief Complaint   Patient presents with    Nasal Congestion    Sinus Problem      Patient ID: Danuta Hutton is a 63 y.o. female who presents today with c/o nasal congestion, postnasal drip, scratchy throat and cough for about 10 days, she is now having yellow/ green mucus production, facial pain, swollen glands of and on and losing her voice. She also developed left eye redness, crusting and otching. Her home Covid test was negative. No fever or chills, no chest pain, no shortness of breath. She has been taking OTC cold medications without significant relief.           The  following portions of the patient's history were reviewed and updated as appropriate: allergies, current medications, past family history, past medical history, past social history, past surgical history and problem list.    Past Medical History:   Diagnosis Date    Anxiety     Hyperlipidemia     Hypertension     Microscopic hematuria     Subclinical hypothyroidism      Past Surgical History:   Procedure Laterality Date    CATARACT EXTRACTION Right      SECTION      TONSILLECTOMY      TOTAL ABDOMINAL HYSTERECTOMY  2010    w/left ovary removal. Last assessed: 16     Family History   Problem Relation Age of Onset    Arthritis Mother     Heart disease Father     Diabetes Father     Diabetes type I Son         Mellitus    Thyroid disease Sister     Aneurysm Sister     No Known Problems Sister     Mental illness Neg Hx     Substance Abuse Neg Hx     Alcohol abuse Neg Hx      Social History     Socioeconomic History    Marital status: /Civil Union     Spouse name: Not on file    Number of children: Not on file    Years of education: Not on file    Highest education level: Not on file   Occupational History    Not on file   Tobacco Use    Smoking status: Never     Passive exposure: Never    Smokeless tobacco: Never   Vaping Use    Vaping status: Never Used   Substance and Sexual Activity    Alcohol use: No    Drug use: No    Sexual activity: Yes     Partners: Male     Birth control/protection: Female Sterilization   Other Topics Concern    Not on file   Social History Narrative    Always uses seatbelts    Caffeine use: 2 cups coffee/day    Has smoke detectors     Social Determinants of Health     Financial Resource Strain: Not on file   Food Insecurity: Not on file   Transportation Needs: Not on file   Physical Activity: Not on file   Stress: Not on file   Social Connections: Not on file   Intimate Partner Violence: Not on file   Housing Stability: Not on file       Current Outpatient  "Medications:     amoxicillin-clavulanate (AUGMENTIN) 875-125 mg per tablet, Take 1 tablet by mouth 2 (two) times a day with meals for 10 days, Disp: 20 tablet, Rfl: 0    ketoconazole (NIZORAL) 2 % cream, Apply topically 2 (two) times a day, Disp: 30 g, Rfl: 2    losartan (COZAAR) 25 mg tablet, Take 1 tablet (25 mg total) by mouth daily, Disp: 30 tablet, Rfl: 3    predniSONE 10 mg tablet, Take 4 tablets daily with food for 2 days, 3 tablets daily for 2 days, 2 tablets daily for 2 days, 1 tablet daily for 2 days, Disp: 20 tablet, Rfl: 0    rosuvastatin (CRESTOR) 5 mg tablet, take 1 tablet by mouth once daily AFTER DINNER, Disp: 90 tablet, Rfl: 3    tobramycin (TOBREX) 0.3 % SOLN, Administer 2 drops into the left eye every 4 (four) hours while awake, Disp: 5 mL, Rfl: 0    triamcinolone (KENALOG) 0.1 % cream, Apply topically 2 (two) times a day, Disp: 30 g, Rfl: 0    Review of Systems   Constitutional:  Positive for fatigue. Negative for appetite change, chills and fever.   HENT:  Positive for congestion, postnasal drip, sinus pain and voice change. Negative for ear pain, sore throat and trouble swallowing.    Eyes:  Positive for discharge, redness and itching. Negative for photophobia, pain and visual disturbance.   Respiratory:  Positive for cough. Negative for shortness of breath and wheezing.    Cardiovascular:  Negative for chest pain, palpitations and leg swelling.   Gastrointestinal:  Negative for abdominal pain, blood in stool, diarrhea, nausea and vomiting.   Genitourinary:  Negative for dysuria, frequency, hematuria and urgency.   Neurological:  Negative for dizziness, syncope and headaches.           Objective:    Vitals:    02/09/24 0855   BP: 124/88   Pulse: 75   Resp: 15   Temp: 97.8 °F (36.6 °C)   TempSrc: Oral   SpO2: 98%   Weight: 74.6 kg (164 lb 6.4 oz)   Height: 5' 5\" (1.651 m)        Physical Exam  Constitutional:       General: She is not in acute distress.     Appearance: She is well-developed. "   HENT:      Head: Normocephalic and atraumatic.      Right Ear: Tympanic membrane and ear canal normal.      Left Ear: Tympanic membrane and ear canal normal.      Nose: Congestion present.      Right Turbinates: Swollen.      Left Turbinates: Swollen.      Mouth/Throat:      Pharynx: No oropharyngeal exudate or posterior oropharyngeal erythema.   Eyes:      General: Lids are normal.      Conjunctiva/sclera:      Left eye: Left conjunctiva is injected.      Pupils: Pupils are equal, round, and reactive to light.   Cardiovascular:      Rate and Rhythm: Normal rate and regular rhythm.      Heart sounds: Normal heart sounds. No murmur heard.  Pulmonary:      Effort: Pulmonary effort is normal. No respiratory distress.      Breath sounds: Normal breath sounds. No wheezing, rhonchi or rales.   Musculoskeletal:      Cervical back: Neck supple.      Right lower leg: No edema.      Left lower leg: No edema.   Lymphadenopathy:      Cervical: No cervical adenopathy.   Neurological:      Mental Status: She is alert and oriented to person, place, and time.   Psychiatric:         Mood and Affect: Mood normal.         Behavior: Behavior normal.

## 2024-03-31 LAB
ALBUMIN SERPL-MCNC: 4.4 G/DL (ref 3.6–5.1)
ALBUMIN/GLOB SERPL: 1.6 (CALC) (ref 1–2.5)
ALP SERPL-CCNC: 59 U/L (ref 37–153)
ALT SERPL-CCNC: 29 U/L (ref 6–29)
AST SERPL-CCNC: 27 U/L (ref 10–35)
BASOPHILS # BLD AUTO: 61 CELLS/UL (ref 0–200)
BASOPHILS NFR BLD AUTO: 1 %
BILIRUB SERPL-MCNC: 0.6 MG/DL (ref 0.2–1.2)
BUN SERPL-MCNC: 14 MG/DL (ref 7–25)
BUN/CREAT SERPL: ABNORMAL (CALC) (ref 6–22)
CALCIUM SERPL-MCNC: 9.6 MG/DL (ref 8.6–10.4)
CHLORIDE SERPL-SCNC: 103 MMOL/L (ref 98–110)
CHOLEST SERPL-MCNC: 125 MG/DL
CHOLEST/HDLC SERPL: 3.8 (CALC)
CO2 SERPL-SCNC: 31 MMOL/L (ref 20–32)
CREAT SERPL-MCNC: 0.58 MG/DL (ref 0.5–1.05)
EOSINOPHIL # BLD AUTO: 409 CELLS/UL (ref 15–500)
EOSINOPHIL NFR BLD AUTO: 6.7 %
ERYTHROCYTE [DISTWIDTH] IN BLOOD BY AUTOMATED COUNT: 12.4 % (ref 11–15)
GFR/BSA.PRED SERPLBLD CYS-BASED-ARV: 102 ML/MIN/1.73M2
GLOBULIN SER CALC-MCNC: 2.8 G/DL (CALC) (ref 1.9–3.7)
GLUCOSE SERPL-MCNC: 101 MG/DL (ref 65–99)
HCT VFR BLD AUTO: 44.5 % (ref 35–45)
HDLC SERPL-MCNC: 33 MG/DL
HGB BLD-MCNC: 14.6 G/DL (ref 11.7–15.5)
LDLC SERPL CALC-MCNC: 70 MG/DL (CALC)
LYMPHOCYTES # BLD AUTO: 2178 CELLS/UL (ref 850–3900)
LYMPHOCYTES NFR BLD AUTO: 35.7 %
MCH RBC QN AUTO: 29.6 PG (ref 27–33)
MCHC RBC AUTO-ENTMCNC: 32.8 G/DL (ref 32–36)
MCV RBC AUTO: 90.3 FL (ref 80–100)
MONOCYTES # BLD AUTO: 775 CELLS/UL (ref 200–950)
MONOCYTES NFR BLD AUTO: 12.7 %
NEUTROPHILS # BLD AUTO: 2678 CELLS/UL (ref 1500–7800)
NEUTROPHILS NFR BLD AUTO: 43.9 %
NONHDLC SERPL-MCNC: 92 MG/DL (CALC)
PLATELET # BLD AUTO: 347 THOUSAND/UL (ref 140–400)
PMV BLD REES-ECKER: 11.1 FL (ref 7.5–12.5)
POTASSIUM SERPL-SCNC: 3.9 MMOL/L (ref 3.5–5.3)
PROT SERPL-MCNC: 7.2 G/DL (ref 6.1–8.1)
RBC # BLD AUTO: 4.93 MILLION/UL (ref 3.8–5.1)
SODIUM SERPL-SCNC: 141 MMOL/L (ref 135–146)
T4 FREE SERPL-MCNC: 1 NG/DL (ref 0.8–1.8)
TRIGL SERPL-MCNC: 139 MG/DL
TSH SERPL-ACNC: 7.13 MIU/L (ref 0.4–4.5)
WBC # BLD AUTO: 6.1 THOUSAND/UL (ref 3.8–10.8)

## 2024-04-01 DIAGNOSIS — E78.5 HYPERLIPIDEMIA, UNSPECIFIED HYPERLIPIDEMIA TYPE: ICD-10-CM

## 2024-04-01 RX ORDER — ROSUVASTATIN CALCIUM 5 MG/1
TABLET, COATED ORAL
Qty: 90 TABLET | Refills: 3 | Status: SHIPPED | OUTPATIENT
Start: 2024-04-01

## 2024-04-17 ENCOUNTER — OFFICE VISIT (OUTPATIENT)
Dept: FAMILY MEDICINE CLINIC | Facility: CLINIC | Age: 64
End: 2024-04-17
Payer: COMMERCIAL

## 2024-04-17 VITALS
SYSTOLIC BLOOD PRESSURE: 152 MMHG | HEART RATE: 98 BPM | BODY MASS INDEX: 27.42 KG/M2 | WEIGHT: 164.6 LBS | DIASTOLIC BLOOD PRESSURE: 88 MMHG | HEIGHT: 65 IN | TEMPERATURE: 97.8 F | RESPIRATION RATE: 16 BRPM | OXYGEN SATURATION: 98 %

## 2024-04-17 DIAGNOSIS — E03.8 SUBCLINICAL HYPOTHYROIDISM: ICD-10-CM

## 2024-04-17 DIAGNOSIS — E78.5 HYPERLIPIDEMIA, UNSPECIFIED HYPERLIPIDEMIA TYPE: ICD-10-CM

## 2024-04-17 DIAGNOSIS — I10 ESSENTIAL HYPERTENSION: Primary | ICD-10-CM

## 2024-04-17 DIAGNOSIS — M25.561 RIGHT KNEE PAIN, UNSPECIFIED CHRONICITY: ICD-10-CM

## 2024-04-17 PROCEDURE — 99214 OFFICE O/P EST MOD 30 MIN: CPT | Performed by: FAMILY MEDICINE

## 2024-04-17 NOTE — PROGRESS NOTES
Assessment/Plan:    Essential hypertension  - BP is elevated today, advised to continue losartan 25 mg daily and encourage better medication compliance, continue home BP monitoring and call the office in 1-2 weeks with home BP readings    Right knee pain  - advised rest, ice, Tylenol or Advil as needed, will obtain x-ray and refer to Ortho for further evaluation  - XR knee 3 vw right non injury; Future  - Ambulatory Referral to Orthopedic Surgery; Future    Hyperlipidemia, unspecified hyperlipidemia type  - LDL 70, Tg 139, better controlled , continue rosuvastatin 5 mg daily and life style changes    Subclinical hypothyroidism  - TSH mildly elevated 7.13 with normal free T4 1.0, s/s of hypothyroidism reviewed, patient declined initiation of Synthroid, will continue monitoring and recheck TFT's in 6 months          Return in August for annual physical or sooner as needed. The patient understands and agrees with the treatment plan.      Subjective:   Chief Complaint   Patient presents with    Hypertension    Hyperlipidemia     recheck      Patient ID: Danuta Hutton is a 63 y.o. female presents today for follow up hypertension, hyperlipidemia and review her lab results. Patient reports right knee pain, stiffness and swelling on and off for about a year, worse with prolonged standing at work, walking up the hill or going up or down stairs. Patient denies any particular injury, knee redness, locking or giving out. Patient offers no other complaints, she did not take her losartan this am, but usually takes her medication as prescribed.         The following portions of the patient's history were reviewed and updated as appropriate: allergies, current medications, past family history, past medical history, past social history, past surgical history and problem list.    Past Medical History:   Diagnosis Date    Anxiety     Hyperlipidemia     Hypertension     Microscopic hematuria     Subclinical hypothyroidism      Past  Surgical History:   Procedure Laterality Date    CATARACT EXTRACTION Right      SECTION      TONSILLECTOMY      TOTAL ABDOMINAL HYSTERECTOMY  2010    w/left ovary removal. Last assessed: 17     Family History   Problem Relation Age of Onset    Arthritis Mother     Heart disease Father     Diabetes Father     Diabetes type I Son         Mellitus    Thyroid disease Sister     Aneurysm Sister     No Known Problems Sister     Mental illness Neg Hx     Substance Abuse Neg Hx     Alcohol abuse Neg Hx      Social History     Socioeconomic History    Marital status: /Civil Union     Spouse name: Not on file    Number of children: Not on file    Years of education: Not on file    Highest education level: Not on file   Occupational History    Not on file   Tobacco Use    Smoking status: Never     Passive exposure: Never    Smokeless tobacco: Never   Vaping Use    Vaping status: Never Used   Substance and Sexual Activity    Alcohol use: No    Drug use: No    Sexual activity: Yes     Partners: Male     Birth control/protection: Female Sterilization   Other Topics Concern    Not on file   Social History Narrative    Always uses seatbelts    Caffeine use: 2 cups coffee/day    Has smoke detectors     Social Determinants of Health     Financial Resource Strain: Not on file   Food Insecurity: Not on file   Transportation Needs: Not on file   Physical Activity: Not on file   Stress: Not on file   Social Connections: Not on file   Intimate Partner Violence: Not on file   Housing Stability: Not on file       Current Outpatient Medications:     ketoconazole (NIZORAL) 2 % cream, Apply topically 2 (two) times a day, Disp: 30 g, Rfl: 2    losartan (COZAAR) 25 mg tablet, Take 1 tablet (25 mg total) by mouth daily, Disp: 30 tablet, Rfl: 3    rosuvastatin (CRESTOR) 5 mg tablet, take 1 tablet by mouth once daily AFTER DINNER, Disp: 90 tablet, Rfl: 3    tobramycin (TOBREX) 0.3 % SOLN, Administer 2 drops into  "the left eye every 4 (four) hours while awake, Disp: 5 mL, Rfl: 0    triamcinolone (KENALOG) 0.1 % cream, Apply topically 2 (two) times a day, Disp: 30 g, Rfl: 0    predniSONE 10 mg tablet, Take 4 tablets daily with food for 2 days, 3 tablets daily for 2 days, 2 tablets daily for 2 days, 1 tablet daily for 2 days (Patient not taking: Reported on 4/17/2024), Disp: 20 tablet, Rfl: 0    Review of Systems   Constitutional:  Negative for appetite change, chills, fatigue and fever.   Respiratory:  Negative for cough, shortness of breath and wheezing.    Cardiovascular:  Negative for chest pain, palpitations and leg swelling.   Gastrointestinal:  Negative for abdominal pain, blood in stool, diarrhea, nausea and vomiting.   Genitourinary:  Negative for dysuria, frequency, hematuria and urgency.   Musculoskeletal:         Right knee pain   Neurological:  Negative for dizziness, syncope, weakness, numbness and headaches.   Psychiatric/Behavioral:  Negative for dysphoric mood and sleep disturbance. The patient is not nervous/anxious.              Objective:    Vitals:    04/17/24 1620   BP: 152/88   Pulse: 98   Resp: 16   Temp: 97.8 °F (36.6 °C)   SpO2: 98%   Weight: 74.7 kg (164 lb 9.6 oz)   Height: 5' 5\" (1.651 m)        Physical Exam  Constitutional:       General: She is not in acute distress.     Appearance: She is well-developed.   Neck:      Thyroid: No thyromegaly.   Cardiovascular:      Rate and Rhythm: Normal rate and regular rhythm.      Heart sounds: Normal heart sounds. No murmur heard.  Pulmonary:      Effort: Pulmonary effort is normal. No respiratory distress.      Breath sounds: No wheezing, rhonchi or rales.   Chest:      Chest wall: No tenderness.   Abdominal:      General: There is no distension.      Palpations: Abdomen is soft. There is no mass.      Tenderness: There is no abdominal tenderness.   Musculoskeletal:      Cervical back: Neck supple.      Right lower leg: No edema.      Left lower leg: No " edema.      Comments: Right knee without erythema or warmth, mild swelling, good ROM, no joint line tenderness or ligamentous laxity    Lymphadenopathy:      Cervical: No cervical adenopathy.   Neurological:      Mental Status: She is alert and oriented to person, place, and time.   Psychiatric:         Mood and Affect: Mood normal.         Behavior: Behavior normal.         Thought Content: Thought content normal.        Lab Results   Component Value Date    CHOLESTEROL 125 03/30/2024    CHOLESTEROL 197 01/16/2023     Lab Results   Component Value Date    HDL 33 (L) 03/30/2024    HDL 43 (L) 01/16/2023     Lab Results   Component Value Date    TRIG 139 03/30/2024    TRIG 183 (H) 01/16/2023     Lab Results   Component Value Date    LDLCALC 70 03/30/2024     Lab Results   Component Value Date    WBC 6.1 03/30/2024    HGB 14.6 03/30/2024    HCT 44.5 03/30/2024    MCV 90.3 03/30/2024     03/30/2024     Lab Results   Component Value Date    SODIUM 141 03/30/2024    K 3.9 03/30/2024     03/30/2024    CO2 31 03/30/2024    BUN 14 03/30/2024    CREATININE 0.58 03/30/2024    GLUC 101 (H) 03/30/2024    CALCIUM 9.6 03/30/2024    AST 27 03/30/2024    ALT 29 03/30/2024    ALKPHOS 59 03/30/2024    TP 7.2 03/30/2024    TBILI 0.6 03/30/2024    EGFR 102 03/30/2024

## 2024-05-08 ENCOUNTER — HOSPITAL ENCOUNTER (OUTPATIENT)
Dept: RADIOLOGY | Facility: HOSPITAL | Age: 64
Discharge: HOME | End: 2024-05-08
Payer: COMMERCIAL

## 2024-05-08 DIAGNOSIS — M25.561 RIGHT KNEE PAIN, UNSPECIFIED CHRONICITY: ICD-10-CM

## 2024-05-08 PROCEDURE — 73562 X-RAY EXAM OF KNEE 3: CPT

## 2024-05-09 ENCOUNTER — OFFICE VISIT (OUTPATIENT)
Dept: FAMILY MEDICINE CLINIC | Facility: CLINIC | Age: 64
End: 2024-05-09
Payer: COMMERCIAL

## 2024-05-09 VITALS
OXYGEN SATURATION: 98 % | BODY MASS INDEX: 27.06 KG/M2 | TEMPERATURE: 97.7 F | DIASTOLIC BLOOD PRESSURE: 84 MMHG | SYSTOLIC BLOOD PRESSURE: 142 MMHG | WEIGHT: 162.4 LBS | HEIGHT: 65 IN | RESPIRATION RATE: 16 BRPM | HEART RATE: 88 BPM

## 2024-05-09 DIAGNOSIS — J02.9 SORE THROAT: ICD-10-CM

## 2024-05-09 DIAGNOSIS — J32.1 FRONTAL SINUSITIS, UNSPECIFIED CHRONICITY: Primary | ICD-10-CM

## 2024-05-09 DIAGNOSIS — H10.31 ACUTE CONJUNCTIVITIS OF RIGHT EYE, UNSPECIFIED ACUTE CONJUNCTIVITIS TYPE: ICD-10-CM

## 2024-05-09 DIAGNOSIS — R09.81 NASAL CONGESTION: ICD-10-CM

## 2024-05-09 LAB
S PYO DNA THROAT QL NAA+PROBE: NOT DETECTED
SARS-COV-2 AG UPPER RESP QL IA: NEGATIVE
VALID CONTROL: NORMAL

## 2024-05-09 PROCEDURE — 99213 OFFICE O/P EST LOW 20 MIN: CPT | Performed by: STUDENT IN AN ORGANIZED HEALTH CARE EDUCATION/TRAINING PROGRAM

## 2024-05-09 PROCEDURE — 87651 STREP A DNA AMP PROBE: CPT | Performed by: STUDENT IN AN ORGANIZED HEALTH CARE EDUCATION/TRAINING PROGRAM

## 2024-05-09 PROCEDURE — 87811 SARS-COV-2 COVID19 W/OPTIC: CPT | Performed by: STUDENT IN AN ORGANIZED HEALTH CARE EDUCATION/TRAINING PROGRAM

## 2024-05-09 RX ORDER — AMOXICILLIN AND CLAVULANATE POTASSIUM 500; 125 MG/1; MG/1
1 TABLET, FILM COATED ORAL EVERY 12 HOURS SCHEDULED
Qty: 14 TABLET | Refills: 0 | Status: SHIPPED | OUTPATIENT
Start: 2024-05-09 | End: 2024-05-16

## 2024-05-09 RX ORDER — CIPROFLOXACIN HYDROCHLORIDE 3.5 MG/ML
1 SOLUTION/ DROPS TOPICAL EVERY 4 HOURS
Qty: 1.5 ML | Refills: 0 | Status: SHIPPED | OUTPATIENT
Start: 2024-05-09 | End: 2024-05-14

## 2024-05-09 NOTE — PROGRESS NOTES
Name: Danuta Hutton      : 1960      MRN: [id number]  Encounter Provider: Yoselin Arredondo MD  Encounter Date: 2024   Encounter department: [facility]    Assessment & Plan     1. Frontal sinusitis, unspecified chronicity  -     amoxicillin-clavulanate (Augmentin) 500-125 mg per tablet; Take 1 tablet by mouth every 12 (twelve) hours for 7 days    2. Nasal congestion  -     POCT Rapid Covid Ag    3. Acute conjunctivitis of right eye, unspecified acute conjunctivitis type  -     ciprofloxacin (CILOXAN) 0.3 % ophthalmic solution; Administer 1 drop to the right eye every 4 (four) hours for 5 days    4. Sore throat  -     POCT rapid PCR strepA    Vitals reviewed with patient, rapid strep and COVID in office negative    Frontal sinusitis: Discussed with patient we will start her on Augmentin twice a day for 7 days  Will benefit from nasal saline rinses    Right eye conjunctivitis: Discussed with patient to follow good hand hygiene  Start Cipro drops in affected eye as directed     Follow-up as needed if symptoms worsen or do not improve  Subjective      Danuta is a 63-year-old female who presents to the office today for acute care visit.  Patient reports over the last week she has noted increasing in nasal congestion sore throat and feeling ill.  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  Patient reports over the last 2 to 3 days she has also noted right eye irritation along with the getting pink.  Reports feeling warm Tmax 99 F, took some Advil with relief.      Review of Systems   Constitutional:  Negative for appetite change.   HENT:  Positive for congestion, sinus pressure, sinus pain and sore throat. Negative for ear pain.    Eyes:  Positive for pain, discharge and itching.   Respiratory:  Positive for cough. Negative for chest tightness and shortness of breath.    Cardiovascular:  Negative for chest pain.   Gastrointestinal:   "Negative for abdominal pain.   Genitourinary:  Negative for dysuria.   Neurological:  Negative for dizziness, light-headedness and headaches.       Current Outpatient Medications on File Prior to Visit   Medication Sig    ketoconazole (NIZORAL) 2 % cream Apply topically 2 (two) times a day    losartan (COZAAR) 25 mg tablet Take 1 tablet (25 mg total) by mouth daily    rosuvastatin (CRESTOR) 5 mg tablet take 1 tablet by mouth once daily AFTER DINNER    triamcinolone (KENALOG) 0.1 % cream Apply topically 2 (two) times a day       Objective     /84   Pulse 88   Temp 97.7 °F (36.5 °C)   Resp 16   Ht 5' 5\" (1.651 m)   Wt 73.7 kg (162 lb 6.4 oz)   SpO2 98%   BMI 27.02 kg/m²     Physical Exam  HENT:      Head: Normocephalic and atraumatic.      Right Ear: Tympanic membrane, ear canal and external ear normal.      Left Ear: Tympanic membrane, ear canal and external ear normal.      Nose: Congestion present.      Mouth/Throat:      Pharynx: Posterior oropharyngeal erythema present. No oropharyngeal exudate.   Eyes:      General: Lids are normal. Vision grossly intact.         Right eye: No foreign body.         Left eye: No foreign body or discharge.      Conjunctiva/sclera:      Right eye: Right conjunctiva is injected. Exudate present.      Left eye: Left conjunctiva is not injected. No exudate.  Cardiovascular:      Rate and Rhythm: Normal rate and regular rhythm.      Pulses: Normal pulses.      Heart sounds: Normal heart sounds.   Pulmonary:      Effort: Pulmonary effort is normal.      Breath sounds: Normal breath sounds. No wheezing.   Neurological:      Mental Status: She is alert.   Psychiatric:         Mood and Affect: Mood normal.       Yoselin Arredondo MD    "

## 2024-05-22 ENCOUNTER — OFFICE VISIT (OUTPATIENT)
Dept: FAMILY MEDICINE CLINIC | Facility: CLINIC | Age: 64
End: 2024-05-22
Payer: COMMERCIAL

## 2024-05-22 VITALS
RESPIRATION RATE: 16 BRPM | HEART RATE: 88 BPM | HEIGHT: 65 IN | SYSTOLIC BLOOD PRESSURE: 126 MMHG | WEIGHT: 161.9 LBS | OXYGEN SATURATION: 98 % | TEMPERATURE: 97.7 F | DIASTOLIC BLOOD PRESSURE: 84 MMHG | BODY MASS INDEX: 26.98 KG/M2

## 2024-05-22 DIAGNOSIS — J32.1 FRONTAL SINUSITIS, UNSPECIFIED CHRONICITY: Primary | ICD-10-CM

## 2024-05-22 PROCEDURE — 99213 OFFICE O/P EST LOW 20 MIN: CPT | Performed by: STUDENT IN AN ORGANIZED HEALTH CARE EDUCATION/TRAINING PROGRAM

## 2024-05-22 RX ORDER — DOXYCYCLINE HYCLATE 100 MG
100 TABLET ORAL 2 TIMES DAILY
Qty: 14 TABLET | Refills: 0 | Status: SHIPPED | OUTPATIENT
Start: 2024-05-22 | End: 2024-05-29

## 2024-05-22 RX ORDER — NEOMYCIN SULFATE, POLYMYXIN B SULFATE AND DEXAMETHASONE 3.5; 10000; 1 MG/ML; [USP'U]/ML; MG/ML
SUSPENSION/ DROPS OPHTHALMIC
COMMUNITY
Start: 2024-05-21

## 2024-05-22 NOTE — PROGRESS NOTES
Ambulatory Visit  Name: Danuta Hutton      : 1960      MRN: [id number]  Encounter Provider: Yoselin Arredondo MD  Encounter Date: 2024   Encounter department: [facility]    Assessment & Plan   1. Frontal sinusitis, unspecified chronicity  -     doxycycline hyclate (VIBRA-TABS) 100 mg tablet; Take 1 tablet (100 mg total) by mouth 2 (two) times a day for 7 days  Was reviewed with patient, nasal saline rinses strongly encouraged to patient  Will start patient on doxycycline twice a day for 7 days  Discussed with patient to take with food for better tolerance and sit upright for at least 30 minutes after taking medication and avoid sun exposure    Follow-up as needed     History of Present Illness     Danuta is a 63-year-old female who presents to the office today for an acute care visit.  Patient was recently treated for sinusitis with Augmentin patient reports minimal improvement since completing antibiotic course.  Continues to have frontal sinus pressure along with congestion.  Denies any fever or sick contacts      Review of Systems   Constitutional:  Negative for chills, fatigue and fever.   HENT:  Positive for congestion, sinus pressure and sinus pain.    Respiratory:  Negative for cough, chest tightness and shortness of breath.    Gastrointestinal:  Negative for abdominal pain.   Neurological:  Negative for dizziness and light-headedness.     Past Medical History   Past Medical History:   Diagnosis Date    Anxiety     Hyperlipidemia     Hypertension     Microscopic hematuria     Subclinical hypothyroidism      Past Surgical History:   Procedure Laterality Date    CATARACT EXTRACTION Right      SECTION      TONSILLECTOMY      TOTAL ABDOMINAL HYSTERECTOMY  2010    w/left ovary removal. Last assessed: 16     Family History   Problem Relation Age of Onset    Arthritis Mother     Heart disease Father     Diabetes Father     Diabetes type  "I Son         Mellitus    Thyroid disease Sister     Aneurysm Sister     No Known Problems Sister     Mental illness Neg Hx     Substance Abuse Neg Hx     Alcohol abuse Neg Hx      Current Outpatient Medications on File Prior to Visit   Medication Sig Dispense Refill    ketoconazole (NIZORAL) 2 % cream Apply topically 2 (two) times a day 30 g 2    losartan (COZAAR) 25 mg tablet Take 1 tablet (25 mg total) by mouth daily 30 tablet 3    neomycin-polymyxin-dexamethasone (MAXITROL) ophthalmic suspension       rosuvastatin (CRESTOR) 5 mg tablet take 1 tablet by mouth once daily AFTER DINNER 90 tablet 3    triamcinolone (KENALOG) 0.1 % cream Apply topically 2 (two) times a day 30 g 0     No current facility-administered medications on file prior to visit.     Allergies   Allergen Reactions    Zithromax [Azithromycin]      Other reaction(s): GI Upset  Category: Allergy;     Clindamycin Dizziness and Fatigue    Codeine Drowsiness      Current Outpatient Medications on File Prior to Visit   Medication Sig Dispense Refill    ketoconazole (NIZORAL) 2 % cream Apply topically 2 (two) times a day 30 g 2    losartan (COZAAR) 25 mg tablet Take 1 tablet (25 mg total) by mouth daily 30 tablet 3    neomycin-polymyxin-dexamethasone (MAXITROL) ophthalmic suspension       rosuvastatin (CRESTOR) 5 mg tablet take 1 tablet by mouth once daily AFTER DINNER 90 tablet 3    triamcinolone (KENALOG) 0.1 % cream Apply topically 2 (two) times a day 30 g 0     No current facility-administered medications on file prior to visit.      Social History     Tobacco Use    Smoking status: Never     Passive exposure: Never    Smokeless tobacco: Never   Vaping Use    Vaping status: Never Used   Substance and Sexual Activity    Alcohol use: No    Drug use: No    Sexual activity: Yes     Partners: Male     Birth control/protection: Female Sterilization     Objective     /84   Pulse 88   Temp 97.7 °F (36.5 °C)   Resp 16   Ht 5' 5\" (1.651 m)   Wt 73.4 " kg (161 lb 14.4 oz)   SpO2 98%   BMI 26.94 kg/m²     Physical Exam  Vitals reviewed.   HENT:      Right Ear: Tympanic membrane, ear canal and external ear normal.      Left Ear: Tympanic membrane, ear canal and external ear normal.      Nose: Congestion present.      Right Sinus: Frontal sinus tenderness present.      Left Sinus: Frontal sinus tenderness present.   Eyes:      Extraocular Movements: Extraocular movements intact.   Cardiovascular:      Rate and Rhythm: Normal rate.   Pulmonary:      Effort: Pulmonary effort is normal.   Neurological:      Mental Status: She is alert.   Psychiatric:         Mood and Affect: Mood normal.       Administrative Statements   I have spent a total time of 20 minutes on 05/22/24 In caring for this patient including Risks and benefits of tx options, Impressions, Documenting in the medical record, Reviewing / ordering tests, medicine, procedures  , and Obtaining or reviewing history  .

## 2024-06-01 DIAGNOSIS — I10 ESSENTIAL HYPERTENSION: ICD-10-CM

## 2024-06-01 RX ORDER — LOSARTAN POTASSIUM 25 MG/1
25 TABLET ORAL DAILY
Qty: 30 TABLET | Refills: 5 | Status: SHIPPED | OUTPATIENT
Start: 2024-06-01

## 2024-06-14 ENCOUNTER — OFFICE VISIT (OUTPATIENT)
Dept: OBGYN CLINIC | Facility: CLINIC | Age: 64
End: 2024-06-14
Payer: COMMERCIAL

## 2024-06-14 VITALS
DIASTOLIC BLOOD PRESSURE: 90 MMHG | WEIGHT: 161 LBS | HEIGHT: 65 IN | SYSTOLIC BLOOD PRESSURE: 140 MMHG | BODY MASS INDEX: 26.82 KG/M2 | HEART RATE: 99 BPM

## 2024-06-14 DIAGNOSIS — M17.11 PRIMARY OSTEOARTHRITIS OF RIGHT KNEE: Primary | ICD-10-CM

## 2024-06-14 DIAGNOSIS — M25.561 RIGHT KNEE PAIN, UNSPECIFIED CHRONICITY: ICD-10-CM

## 2024-06-14 PROCEDURE — 99203 OFFICE O/P NEW LOW 30 MIN: CPT | Performed by: STUDENT IN AN ORGANIZED HEALTH CARE EDUCATION/TRAINING PROGRAM

## 2024-06-14 NOTE — PROGRESS NOTES
Knee New Office Note    Assessment:     1. Primary osteoarthritis of right knee    2. Right knee pain, unspecified chronicity        Plan:     Problem List Items Addressed This Visit    None  Visit Diagnoses       Primary osteoarthritis of right knee    -  Primary    Right knee pain, unspecified chronicity               62 y/o female with right knee pain secondary to severe OA, worst in the PFJ.  Xrays of the right knee reviewed today showing severe arthritic changes with decreased joint space and osteophyte formation.  On physical exam she has maintained range of motion with pain around the PFJ and an effusion.  Discussed conservative treatment options to include cortisone injections, visco injections, oral NSAIDs, Tylenol, activity modifications, staying active with low impact exercises, and weight loss.      Subjective:     Patient ID: Danuta Hutton is a 63 y.o. female.  Chief Complaint:  HPI:  63 y.o. female who presents today for an evaluation of her RIGHT knee.  She has been experiencing discomfort and swelling in the right knee for several years that has been recently progressing.  She notices that the swelling in the knee waxes and wanes and is made worse with increased activity.  Her pain is worse with going down stairs and steep inclines.  Her pain is localized around the knee cap.  She takes Ibuprofen occasionally, but otherwise has not had any treatment on her knees.  She denies any recent trauma.       Allergy:  Allergies   Allergen Reactions    Zithromax [Azithromycin]      Other reaction(s): GI Upset  Category: Allergy;     Clindamycin Dizziness and Fatigue    Codeine Drowsiness     Medications:  all current active meds have been reviewed  Past Medical History:  Past Medical History:   Diagnosis Date    Anxiety     Hyperlipidemia     Hypertension     Microscopic hematuria     Subclinical hypothyroidism      Past Surgical History:  Past Surgical History:   Procedure Laterality Date    CATARACT  EXTRACTION Right      SECTION      TONSILLECTOMY      TOTAL ABDOMINAL HYSTERECTOMY  2010    w/left ovary removal. Last assessed: 16     Family History:  Family History   Problem Relation Age of Onset    Arthritis Mother     Heart disease Father     Diabetes Father     Diabetes type I Son         Mellitus    Thyroid disease Sister     Aneurysm Sister     No Known Problems Sister     Mental illness Neg Hx     Substance Abuse Neg Hx     Alcohol abuse Neg Hx      Social History:  Social History     Substance and Sexual Activity   Alcohol Use No     Social History     Substance and Sexual Activity   Drug Use No     Social History     Tobacco Use   Smoking Status Never    Passive exposure: Never   Smokeless Tobacco Never           ROS:  General: Per HPI  Skin: Negative, except if noted below  HEENT: Negative  Respiratory: Negative  Cardiovascular: Negative  Gastrointestinal: Negative  Urinary: Negative  Vascular: Negative  Musculoskeletal: Positive per HPI   Neurologic: Positive per HPI  Endocrine: Negative    Objective:  BP Readings from Last 1 Encounters:   24 140/90      Wt Readings from Last 1 Encounters:   24 73 kg (161 lb)        Respiratory:   non-labored respirations    Lymphatics:  no palpable lymph nodes    Gait:   antalgic    Neurologic:   Alert and oriented times 3  Patient with normal sensation except as noted below  Deep tendon reflexes 2+ except as noted in MSK exam    Bilateral Lower Extremity:  Right hip: Full ROM without pain    Right knee:     Inspection:  skin intact    Overall limb alignment neutral    Effusion: moderate    ROM 0-125 wo pain    Extensor Lag: none    Palpation: peripatellar tenderness to palpation    AP Stability at 90 deg stable    M/L stability in full extension stable    M/L stability in midflexion stable    Motor: 5/5 Q/HS/TA/GS/P    Pulses: 2+ DP / 2+ PT    SILT DP/SP/S/S/TN    Imaging:  My interpretation XR AP scanogram/AP bilateral  "knee/lateral/valencia/sunrise right knee: severe joint space narrowing, subchondral sclerosis, subchondral cysts, osteophyte formation. No fracture or dislocation.     BMI:   Estimated body mass index is 26.79 kg/m² as calculated from the following:    Height as of this encounter: 5' 5\" (1.651 m).    Weight as of this encounter: 73 kg (161 lb).  BSA:   Estimated body surface area is 1.8 meters squared as calculated from the following:    Height as of this encounter: 5' 5\" (1.651 m).    Weight as of this encounter: 73 kg (161 lb).           Scribe Attestation      I,:  Elin Ruano PA-C am acting as a scribe while in the presence of the attending physician.:       I,:  Venu Chen, DO personally performed the services described in this documentation    as scribed in my presence.:               "

## 2024-08-19 ENCOUNTER — OFFICE VISIT (OUTPATIENT)
Dept: FAMILY MEDICINE CLINIC | Facility: CLINIC | Age: 64
End: 2024-08-19
Payer: COMMERCIAL

## 2024-08-19 VITALS
WEIGHT: 158.4 LBS | HEIGHT: 65 IN | DIASTOLIC BLOOD PRESSURE: 98 MMHG | SYSTOLIC BLOOD PRESSURE: 160 MMHG | OXYGEN SATURATION: 99 % | TEMPERATURE: 97.9 F | HEART RATE: 98 BPM | BODY MASS INDEX: 26.39 KG/M2 | RESPIRATION RATE: 15 BRPM

## 2024-08-19 DIAGNOSIS — E78.5 HYPERLIPIDEMIA, UNSPECIFIED HYPERLIPIDEMIA TYPE: ICD-10-CM

## 2024-08-19 DIAGNOSIS — E03.8 SUBCLINICAL HYPOTHYROIDISM: ICD-10-CM

## 2024-08-19 DIAGNOSIS — M25.522 LEFT ELBOW PAIN: ICD-10-CM

## 2024-08-19 DIAGNOSIS — Z00.00 ANNUAL PHYSICAL EXAM: Primary | ICD-10-CM

## 2024-08-19 DIAGNOSIS — Z12.31 ENCOUNTER FOR SCREENING MAMMOGRAM FOR BREAST CANCER: ICD-10-CM

## 2024-08-19 DIAGNOSIS — I10 ESSENTIAL HYPERTENSION: ICD-10-CM

## 2024-08-19 PROCEDURE — 99396 PREV VISIT EST AGE 40-64: CPT | Performed by: FAMILY MEDICINE

## 2024-08-19 RX ORDER — LOSARTAN POTASSIUM 25 MG/1
25 TABLET ORAL 2 TIMES DAILY
Qty: 60 TABLET | Refills: 5 | Status: SHIPPED | OUTPATIENT
Start: 2024-08-19

## 2024-08-19 NOTE — PATIENT INSTRUCTIONS
"Patient Education     Routine physical for adults   The Basics   Written by the doctors and editors at [facility]   What is a physical? -- A physical is a routine visit, or \"check-up,\" with your doctor. You might also hear it called a \"wellness visit\" or \"preventive visit.\"  During each visit, the doctor will:   Ask about your physical and mental health   Ask about your habits, behaviors, and lifestyle   Do an exam   Give you vaccines if needed   Talk to you about any medicines you take   Give advice about your health   Answer your questions  Getting regular check-ups is an important part of taking care of your health. It can help your doctor find and treat any problems you have. But it's also important for preventing health problems.  A routine physical is different from a \"sick visit.\" A sick visit is when you see a doctor because of a health concern or problem. Since physicals are scheduled ahead of time, you can think about what you want to ask the doctor.  How often should I get a physical? -- It depends on your age and health. In general, for people age 21 years and older:   If you are younger than 50 years, you might be able to get a physical every 3 years.   If you are 50 years or older, your doctor might recommend a physical every year.  If you have an ongoing health condition, like diabetes or high blood pressure, your doctor will probably want to see you more often.  What happens during a physical? -- In general, each visit will include:   Physical exam - The doctor or nurse will check your height, weight, heart rate, and blood pressure. They will also look at your eyes and ears. They will ask about how you are feeling and whether you have any symptoms that bother you.   Medicines - It's a good idea to bring a list of all the medicines you take to each doctor visit. Your doctor will talk to you about your medicines and answer any questions. Tell them if you are having any side effects that bother you. You " "should also tell them if you are having trouble paying for any of your medicines.   Habits and behaviors - This includes:   Your diet   Your exercise habits   Whether you smoke, drink alcohol, or use drugs   Whether you are sexually active   Whether you feel safe at home  Your doctor will talk to you about things you can do to improve your health and lower your risk of health problems. They will also offer help and support. For example, if you want to quit smoking, they can give you advice and might prescribe medicines. If you want to improve your diet or get more physical activity, they can help you with this, too.   Lab tests, if needed - The tests you get will depend on your age and situation. For example, your doctor might want to check your:   Cholesterol   Blood sugar   Iron level   Vaccines - The recommended vaccines will depend on your age, health, and what vaccines you already had. Vaccines are very important because they can prevent certain serious or deadly infections.   Discussion of screening - \"Screening\" means checking for diseases or other health problems before they cause symptoms. Your doctor can recommend screening based on your age, risk, and preferences. This might include tests to check for:   Cancer, such as breast, prostate, cervical, ovarian, colorectal, prostate, lung, or skin cancer   Sexually transmitted infections, such as chlamydia and gonorrhea   Mental health conditions like depression and anxiety  Your doctor will talk to you about the different types of screening tests. They can help you decide which screenings to have. They can also explain what the results might mean.   Answering questions - The physical is a good time to ask the doctor or nurse questions about your health. If needed, they can refer you to other doctors or specialists, too.  Adults older than 65 years often need other care, too. As you get older, your doctor will talk to you about:   How to prevent falling at " home   Hearing or vision tests   Memory testing   How to take your medicines safely   Making sure that you have the help and support you need at home  All topics are updated as new evidence becomes available and our peer review process is complete.  This topic retrieved from [employer] on: May 02, 2024.  Topic 970935 Version 1.0  Release: 32.4.3 - C32.122  © 2024 UpToDate, Inc. and/or its affiliates. All rights reserved.  Consumer Information Use and Disclaimer   Disclaimer: This generalized information is a limited summary of diagnosis, treatment, and/or medication information. It is not meant to be comprehensive and should be used as a tool to help the user understand and/or assess potential diagnostic and treatment options. It does NOT include all information about conditions, treatments, medications, side effects, or risks that may apply to a specific patient. It is not intended to be medical advice or a substitute for the medical advice, diagnosis, or treatment of a health care provider based on the health care provider's examination and assessment of a patient's specific and unique circumstances. Patients must speak with a health care provider for complete information about their health, medical questions, and treatment options, including any risks or benefits regarding use of medications. This information does not endorse any treatments or medications as safe, effective, or approved for treating a specific patient. UpToDate, Inc. and its affiliates disclaim any warranty or liability relating to this information or the use thereof.The use of this information is governed by the Terms of Use, available at https://www.[employer].com/en/know/clinical-effectiveness-terms. 2024© UpToDate, Inc. and its affiliates and/or licensors. All rights reserved.  Copyright   © 2024 UpToDate, Inc. and/or its affiliates. All rights reserved.
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
## 2024-09-04 ENCOUNTER — ANNUAL EXAM (OUTPATIENT)
Dept: OBGYN CLINIC | Facility: CLINIC | Age: 64
End: 2024-09-04
Payer: COMMERCIAL

## 2024-09-04 VITALS
WEIGHT: 160 LBS | DIASTOLIC BLOOD PRESSURE: 64 MMHG | SYSTOLIC BLOOD PRESSURE: 118 MMHG | BODY MASS INDEX: 26.66 KG/M2 | HEIGHT: 65 IN

## 2024-09-04 DIAGNOSIS — Z12.31 ENCOUNTER FOR SCREENING MAMMOGRAM FOR MALIGNANT NEOPLASM OF BREAST: ICD-10-CM

## 2024-09-04 DIAGNOSIS — Z01.419 ENCOUNTER FOR ANNUAL ROUTINE GYNECOLOGICAL EXAMINATION: Primary | ICD-10-CM

## 2024-09-04 PROCEDURE — S0612 ANNUAL GYNECOLOGICAL EXAMINA: HCPCS | Performed by: STUDENT IN AN ORGANIZED HEALTH CARE EDUCATION/TRAINING PROGRAM

## 2024-09-04 NOTE — PROGRESS NOTES
[facility] - [address], [address], [place] [address]    ASSESSMENT/PLAN: Danuta Hutton is a 64 y.o.  who presents for annual gynecologic exam.    Encounter for routine gynecologic examination  - Routine well woman exam completed today.  - Cervical Cancer Screening: History of abnormal: None. No further pap screening indicated, given s/p total hysterectomy.  - STI screening offered including HIV testing: offered, pt declined  - Breast Cancer Screening: Last Mammogram 2023, Repeat ordered.   - Colorectal cancer screening was not ordered.  - The following were reviewed in today's visit: breast self exam, menopause, exercise, and healthy diet    Additional problems addressed during this visit:  1. Encounter for annual routine gynecological examination  2. Encounter for screening mammogram for malignant neoplasm of breast  -     Mammo screening bilateral w 3d & cad; Future      CC:  Annual Gynecologic Examination    HPI: Danuta Hutton is a 64 y.o.  who presents for annual gynecologic examination. She is without complaint today.     ROS: Negative except as noted in HPI    No LMP recorded. Patient has had a hysterectomy.       She  reports being sexually active and has had partner(s) who are male. She reports using the following method of birth control/protection: Female Sterilization.       The following portions of the patient's history were reviewed and updated as appropriate:  Past Medical History:   Diagnosis Date    Anxiety     Hyperlipidemia     Hypertension     Microscopic hematuria     Subclinical hypothyroidism      Past Surgical History:   Procedure Laterality Date    CATARACT EXTRACTION Right      SECTION      COLONOSCOPY  2023    7 year recall. Due 2030    TONSILLECTOMY      TOTAL ABDOMINAL HYSTERECTOMY  2010    w/left ovary removal. Last assessed: 16     Family History   Problem Relation Age of Onset    Arthritis Mother     Heart  "disease Father     Diabetes Father     Thyroid disease Sister     Aneurysm Sister     No Known Problems Sister     Diabetes type I Son         Mellitus    Mental illness Neg Hx     Substance Abuse Neg Hx     Alcohol abuse Neg Hx     Breast cancer Neg Hx     Ovarian cancer Neg Hx     Uterine cancer Neg Hx     Colon cancer Neg Hx      Social History     Socioeconomic History    Marital status: /Civil Union     Spouse name: None    Number of children: None    Years of education: None    Highest education level: None   Occupational History    None   Tobacco Use    Smoking status: Never     Passive exposure: Never    Smokeless tobacco: Never   Vaping Use    Vaping status: Never Used   Substance and Sexual Activity    Alcohol use: No    Drug use: No    Sexual activity: Yes     Partners: Male     Birth control/protection: Female Sterilization   Other Topics Concern    None   Social History Narrative    Always uses seatbelts    Caffeine use: 2 cups coffee/day    Has smoke detectors     Social Determinants of Health     Financial Resource Strain: Not on file   Food Insecurity: Not on file   Transportation Needs: Not on file   Physical Activity: Not on file   Stress: Not on file   Social Connections: Not on file   Intimate Partner Violence: Not on file   Housing Stability: Not on file     Outpatient Medications Marked as Taking for the 9/4/24 encounter (Annual Exam) with Mj Thayer MD   Medication    ketoconazole (NIZORAL) 2 % cream    losartan (COZAAR) 25 mg tablet    rosuvastatin (CRESTOR) 5 mg tablet    triamcinolone (KENALOG) 0.1 % cream     Allergies   Allergen Reactions    Zithromax [Azithromycin]      Other reaction(s): GI Upset  Category: Allergy;     Clindamycin Dizziness and Fatigue    Codeine Drowsiness           Objective:  /64 (BP Location: Left arm, Patient Position: Sitting, Cuff Size: Standard)   Ht 5' 5\" (1.651 m)   Wt 72.6 kg (160 lb)   BMI 26.63 kg/m²        Chaperone present? Yes: Renetta" Palladino, MA.    General Appearance: alert and oriented, in no acute distress.   Neck/Thyroid: No thyromegaly, no thyroid nodules.  Respiratory: Unlabored breathing.  Cardiovascular: Regular rate, no peripheral edema.  Abdomen: Soft, non-tender, non-distended, no masses, no rebound or guarding.  Breast Exam: No dimpling, nipple retraction or discharge. No lumps or masses. No axillary or supraclavicular nodes.   Pelvic:       External genitalia: Normal appearance, no abnormal pigmentation, no lesions or masses. Normal [place]'s and [place]'s.      Urinary system: Urethral meatus normal, bladder non-tender.      Vaginal: Atrophic mucosa without prolapse or lesions. Normal-appearing physiologic discharge. Vaginal cuff intact and without nodularity.       Cervix: Surgically absent.       Adnexa: No adnexal masses or tenderness noted.      Uterus: Surgically absent.  Extremities: Normal range of motion. Warm, well-perfused, non-tender.   Skin: normal, no rash or abnormalities  Neurologic: alert, oriented x3  Psychiatric: Appropriate affect, mood stable, cooperative with exam.    Mj Thayer MD  9/4/2024 6:40 PM

## 2024-10-29 ENCOUNTER — TELEPHONE (OUTPATIENT)
Age: 64
End: 2024-10-29

## 2024-10-29 DIAGNOSIS — M25.522 LEFT ELBOW PAIN: Primary | ICD-10-CM

## 2024-10-29 NOTE — TELEPHONE ENCOUNTER
Patient call to get an order for an x-ray of her left elbow. Patient need an order for her to get it x-ray because the elbow still hurt the patient. Please call the patient when the order is place in the system. Thank you

## 2024-10-29 NOTE — TELEPHONE ENCOUNTER
Patient calling in to check status. Please call back as soon as you can. She wants to have this today.     Thank you!

## 2024-10-30 ENCOUNTER — HOSPITAL ENCOUNTER (OUTPATIENT)
Dept: RADIOLOGY | Facility: HOSPITAL | Age: 64
Discharge: HOME | End: 2024-10-30
Payer: COMMERCIAL

## 2024-10-30 DIAGNOSIS — M25.522 LEFT ELBOW PAIN: ICD-10-CM

## 2024-10-30 PROCEDURE — 73080 X-RAY EXAM OF ELBOW: CPT

## 2025-01-15 ENCOUNTER — OFFICE VISIT (OUTPATIENT)
Dept: FAMILY MEDICINE CLINIC | Facility: CLINIC | Age: 65
End: 2025-01-15
Payer: COMMERCIAL

## 2025-01-15 VITALS
BODY MASS INDEX: 27.19 KG/M2 | TEMPERATURE: 98.2 F | HEART RATE: 97 BPM | HEIGHT: 65 IN | RESPIRATION RATE: 16 BRPM | DIASTOLIC BLOOD PRESSURE: 80 MMHG | SYSTOLIC BLOOD PRESSURE: 124 MMHG | OXYGEN SATURATION: 99 % | WEIGHT: 163.2 LBS

## 2025-01-15 DIAGNOSIS — M17.11 PRIMARY OSTEOARTHRITIS OF RIGHT KNEE: ICD-10-CM

## 2025-01-15 DIAGNOSIS — M25.561 RIGHT KNEE PAIN, UNSPECIFIED CHRONICITY: Primary | ICD-10-CM

## 2025-01-15 PROCEDURE — 99213 OFFICE O/P EST LOW 20 MIN: CPT | Performed by: FAMILY MEDICINE

## 2025-01-15 RX ORDER — MELOXICAM 7.5 MG/1
7.5 TABLET ORAL DAILY PRN
Qty: 30 TABLET | Refills: 0 | Status: SHIPPED | OUTPATIENT
Start: 2025-01-15

## 2025-01-15 NOTE — PROGRESS NOTES
Assessment/Plan:    Right knee pain/ OA  - x-ray 24 showed moderate degenerative changes with small joint effusion  - discussed rest, ice, meloxicam 7.5 mg prescribed to take as needed, side effects reviewed, Ortho referral provided previously and encouraged to schedule         Return as scheduled or sooner as needed.   The patient understands and agrees with the treatment plan.      Subjective:   Chief Complaint   Patient presents with    Knee Pain     Dx w/ arthrits   Right knee  On going pain       Patient ID: Danuta Hutton is a 64 y.o. female who presents today with c/o right knee pain on and off for over a year, she is now increased pain and mild swelling in the past few weeks, worse when going up or down stairs, with prolonged walking or standing. Patient denies any injury.         The following portions of the patient's history were reviewed and updated as appropriate: allergies, current medications, past family history, past medical history, past social history, past surgical history and problem list.    Past Medical History:   Diagnosis Date    Anxiety     Hyperlipidemia     Hypertension     Microscopic hematuria     Subclinical hypothyroidism      Past Surgical History:   Procedure Laterality Date    CATARACT EXTRACTION Right      SECTION      COLONOSCOPY  2023    7 year recall. Due 2030    TONSILLECTOMY      TOTAL ABDOMINAL HYSTERECTOMY  2010    w/left ovary removal. Last assessed: 16     Family History   Problem Relation Age of Onset    Arthritis Mother     Heart disease Father     Diabetes Father     Thyroid disease Sister     Aneurysm Sister     No Known Problems Sister     Diabetes type I Son         Mellitus    Mental illness Neg Hx     Substance Abuse Neg Hx     Alcohol abuse Neg Hx     Breast cancer Neg Hx     Ovarian cancer Neg Hx     Uterine cancer Neg Hx     Colon cancer Neg Hx      Social History     Socioeconomic History    Marital status: /Civil  "Union     Spouse name: Not on file    Number of children: Not on file    Years of education: Not on file    Highest education level: Not on file   Occupational History    Not on file   Tobacco Use    Smoking status: Never     Passive exposure: Never    Smokeless tobacco: Never   Vaping Use    Vaping status: Never Used   Substance and Sexual Activity    Alcohol use: No    Drug use: No    Sexual activity: Yes     Partners: Male     Birth control/protection: Female Sterilization   Other Topics Concern    Not on file   Social History Narrative    Always uses seatbelts    Caffeine use: 2 cups coffee/day    Has smoke detectors     Social Drivers of Health     Financial Resource Strain: Not on file   Food Insecurity: Not on file   Transportation Needs: Not on file   Physical Activity: Not on file   Stress: Not on file   Social Connections: Not on file   Intimate Partner Violence: Not on file   Housing Stability: Not on file       Current Outpatient Medications:     ketoconazole (NIZORAL) 2 % cream, Apply topically 2 (two) times a day, Disp: 30 g, Rfl: 2    losartan (COZAAR) 25 mg tablet, Take 1 tablet (25 mg total) by mouth 2 (two) times a day, Disp: 60 tablet, Rfl: 5    rosuvastatin (CRESTOR) 5 mg tablet, take 1 tablet by mouth once daily AFTER DINNER, Disp: 90 tablet, Rfl: 3    Review of Systems   Constitutional:  Negative for appetite change, chills, fatigue and fever.   Respiratory:  Negative for shortness of breath.    Cardiovascular:  Negative for chest pain and leg swelling.   Musculoskeletal:         Right knee pain   Neurological:  Negative for syncope, weakness and numbness.           Objective:    Vitals:    01/15/25 1415   BP: 124/80   Patient Position: Sitting   Cuff Size: Standard   Pulse: 97   Resp: 16   Temp: 98.2 °F (36.8 °C)   TempSrc: Temporal   SpO2: 99%   Weight: 74 kg (163 lb 3.2 oz)   Height: 5' 5\" (1.651 m)        Physical Exam  Constitutional:       General: She is not in acute distress.     " Appearance: She is well-developed.   Neck:      Thyroid: No thyromegaly.   Cardiovascular:      Rate and Rhythm: Normal rate and regular rhythm.      Heart sounds: Normal heart sounds. No murmur heard.  Pulmonary:      Effort: Pulmonary effort is normal. No respiratory distress.      Breath sounds: Normal breath sounds.   Musculoskeletal:      Cervical back: Neck supple.      Right lower leg: No edema.      Left lower leg: No edema.      Comments: Right knee without erythema or warmth, mild swelling, good ROM, no joint line tenderness or ligamentous laxity    Lymphadenopathy:      Cervical: No cervical adenopathy.   Neurological:      Mental Status: She is alert and oriented to person, place, and time.   Psychiatric:         Mood and Affect: Mood normal.         Behavior: Behavior normal.

## 2025-02-06 DIAGNOSIS — I10 ESSENTIAL HYPERTENSION: ICD-10-CM

## 2025-02-06 DIAGNOSIS — M25.561 RIGHT KNEE PAIN, UNSPECIFIED CHRONICITY: ICD-10-CM

## 2025-02-07 RX ORDER — LOSARTAN POTASSIUM 25 MG/1
25 TABLET ORAL 2 TIMES DAILY
Qty: 60 TABLET | Refills: 5 | Status: SHIPPED | OUTPATIENT
Start: 2025-02-07

## 2025-02-07 RX ORDER — MELOXICAM 7.5 MG/1
TABLET ORAL
Qty: 30 TABLET | Refills: 0 | Status: SHIPPED | OUTPATIENT
Start: 2025-02-07

## 2025-02-19 ENCOUNTER — OFFICE VISIT (OUTPATIENT)
Dept: FAMILY MEDICINE CLINIC | Facility: CLINIC | Age: 65
End: 2025-02-19
Payer: COMMERCIAL

## 2025-02-19 VITALS
OXYGEN SATURATION: 94 % | SYSTOLIC BLOOD PRESSURE: 142 MMHG | RESPIRATION RATE: 15 BRPM | DIASTOLIC BLOOD PRESSURE: 90 MMHG | HEART RATE: 107 BPM | WEIGHT: 164.2 LBS | BODY MASS INDEX: 27.36 KG/M2 | TEMPERATURE: 98.3 F | HEIGHT: 65 IN

## 2025-02-19 DIAGNOSIS — E78.5 HYPERLIPIDEMIA, UNSPECIFIED HYPERLIPIDEMIA TYPE: ICD-10-CM

## 2025-02-19 DIAGNOSIS — Z00.00 ANNUAL PHYSICAL EXAM: Primary | ICD-10-CM

## 2025-02-19 DIAGNOSIS — M25.561 RIGHT KNEE PAIN, UNSPECIFIED CHRONICITY: ICD-10-CM

## 2025-02-19 DIAGNOSIS — I10 ESSENTIAL HYPERTENSION: ICD-10-CM

## 2025-02-19 DIAGNOSIS — Z13.220 SCREENING FOR CHOLESTEROL LEVEL: ICD-10-CM

## 2025-02-19 DIAGNOSIS — Z13.29 SCREENING FOR THYROID DISORDER: ICD-10-CM

## 2025-02-19 DIAGNOSIS — Z13.1 SCREENING FOR DIABETES MELLITUS: ICD-10-CM

## 2025-02-19 DIAGNOSIS — Z13.228 SCREENING FOR METABOLIC DISORDER: ICD-10-CM

## 2025-02-19 PROCEDURE — 99396 PREV VISIT EST AGE 40-64: CPT | Performed by: FAMILY MEDICINE

## 2025-02-19 NOTE — PROGRESS NOTES
Adult Annual Physical  Name: Danuta Hutton      : 1960      MRN: [id number]  Encounter Provider: Ariadne Marx MD  Encounter Date: 2025   Encounter department: [facility]    Assessment & Plan  Annual physical exam  - discussed Shingrix and Covid vaccines     Essential hypertension  - /90, not at goal, patient reports good home BP readings in 130's/80's, advised to continue losartan 25 mg daily, discussed low sodium diet and regular exercise    Orders:    Comprehensive metabolic panel; Future    Hyperlipidemia, unspecified hyperlipidemia type  - continue rosuvastatin 5 mg daily, will recheck lipid panel and LFT's    Orders:    Lipid Panel with Direct LDL reflex; Future    Comprehensive metabolic panel; Future    Right knee pain, unspecified chronicity  - follow up with Ortho as scheduled       Return in 6 months or sooner as needed.   Immunizations and preventive care screenings were discussed with patient today. Appropriate education was printed on patient's after visit summary.    Counseling:  Alcohol/drug use: discussed moderation in alcohol intake, the recommendations for healthy alcohol use, and avoidance of illicit drug use.  Dental Health: discussed importance of regular tooth brushing, flossing, and dental visits.  Injury prevention: discussed safety/seat belts, safety helmets, smoke detectors, carbon monoxide detectors, and smoking near bedding or upholstery.  Exercise: the importance of regular exercise/physical activity was discussed. Recommend exercise 3-5 times per week for at least 30 minutes.          History of Present Illness     Adult Annual Physical:  Patient presents for annual physical.     Diet and Physical Activity:  - Diet/Nutrition: well balanced diet.  - Exercise: walking and 3-4 times a week on average.    General Health:  - Sleep: sleeps well and 7-8 hours of sleep on average.  - Hearing: normal hearing bilateral ears.  -  "Vision: goes for regular eye exams.  - Dental: regular dental visits.    /GYN Health:  - Follows with GYN: yes.   - Menopause: postmenopausal.     Review of Systems   Constitutional:  Negative for appetite change, chills, fatigue and fever.   HENT:  Negative for congestion, rhinorrhea and sore throat.    Eyes:  Negative for pain, discharge, redness, itching and visual disturbance.   Respiratory:  Negative for cough, shortness of breath and wheezing.    Cardiovascular:  Negative for chest pain, palpitations and leg swelling.   Gastrointestinal:  Negative for abdominal pain, blood in stool, diarrhea, nausea and vomiting.   Endocrine: Negative for cold intolerance, heat intolerance, polydipsia and polyuria.   Genitourinary:  Negative for dysuria, frequency, hematuria and urgency.   Musculoskeletal:  Negative for back pain and neck pain.        Right knee pain   Neurological:  Negative for dizziness, syncope, weakness, numbness and headaches.   Psychiatric/Behavioral:  Negative for dysphoric mood and sleep disturbance. The patient is not nervous/anxious.          Objective   /90   Pulse (!) 107   Temp 98.3 °F (36.8 °C)   Resp 15   Ht 5' 5\" (1.651 m)   Wt 74.5 kg (164 lb 3.2 oz)   SpO2 94%   BMI 27.32 kg/m²     Physical Exam  Constitutional:       General: She is not in acute distress.     Appearance: She is well-developed.   HENT:      Head: Normocephalic and atraumatic.      Right Ear: Tympanic membrane, ear canal and external ear normal.      Left Ear: Tympanic membrane, ear canal and external ear normal.      Mouth/Throat:      Mouth: Mucous membranes are moist.      Pharynx: Oropharynx is clear.   Eyes:      General: No scleral icterus.     Extraocular Movements: Extraocular movements intact.      Conjunctiva/sclera: Conjunctivae normal.      Pupils: Pupils are equal, round, and reactive to light.   Neck:      Thyroid: No thyromegaly.      Vascular: No JVD.   Cardiovascular:      Rate and Rhythm: " Normal rate and regular rhythm.      Heart sounds: Normal heart sounds. No murmur heard.  Pulmonary:      Effort: Pulmonary effort is normal. No respiratory distress.      Breath sounds: Normal breath sounds. No wheezing, rhonchi or rales.   Abdominal:      General: There is no distension.      Palpations: Abdomen is soft. There is no mass.      Tenderness: There is no abdominal tenderness.   Musculoskeletal:      Cervical back: Normal range of motion and neck supple. No tenderness.      Right lower leg: No edema.      Left lower leg: No edema.   Lymphadenopathy:      Cervical: No cervical adenopathy.   Skin:     Findings: No rash.   Neurological:      General: No focal deficit present.      Mental Status: She is alert and oriented to person, place, and time.      Cranial Nerves: No cranial nerve deficit.   Psychiatric:         Mood and Affect: Mood normal.         Behavior: Behavior normal.         Thought Content: Thought content normal.         Judgment: Judgment normal.

## 2025-03-07 ENCOUNTER — OFFICE VISIT (OUTPATIENT)
Dept: OBGYN CLINIC | Facility: CLINIC | Age: 65
End: 2025-03-07
Payer: COMMERCIAL

## 2025-03-07 VITALS — WEIGHT: 164 LBS | HEIGHT: 65 IN | BODY MASS INDEX: 27.32 KG/M2

## 2025-03-07 DIAGNOSIS — M17.11 PRIMARY OSTEOARTHRITIS OF RIGHT KNEE: Primary | ICD-10-CM

## 2025-03-07 PROCEDURE — 99213 OFFICE O/P EST LOW 20 MIN: CPT | Performed by: STUDENT IN AN ORGANIZED HEALTH CARE EDUCATION/TRAINING PROGRAM

## 2025-03-07 PROCEDURE — 20610 DRAIN/INJ JOINT/BURSA W/O US: CPT | Performed by: STUDENT IN AN ORGANIZED HEALTH CARE EDUCATION/TRAINING PROGRAM

## 2025-03-07 RX ADMIN — TRIAMCINOLONE ACETONIDE 40 MG: 40 INJECTION, SUSPENSION INTRA-ARTICULAR; INTRAMUSCULAR at 13:00

## 2025-03-07 RX ADMIN — LIDOCAINE HYDROCHLORIDE 4 ML: 10 INJECTION, SOLUTION INFILTRATION; PERINEURAL at 13:00

## 2025-03-07 NOTE — ASSESSMENT & PLAN NOTE
Findings today are consistent with right knee osteoarthritis, worst in the patellofemoral compartment. Imaging and prognosis was reviewed with the patient today. Discussed treatment options including continued observation, low impact exercises, bracing, anti-inflammatories, physical therapy, cortisone injection, visco injection, versus surgical intervention. Aspiration and cortisone steroid injection was administered to the right knee today. Patient should avoid strenuous activities for the next 1-2 days. Patient should avoid vaccines for the next 2 weeks if possible. If patient is diabetic, should monitor glucose levels for the next 7 to 10 days. Can apply cold compress for soreness. If patient feels relief with the cortisone injections, procedure can be repeated every 3 months. Follow up in 3 months.    Orders:    Large joint arthrocentesis: R knee

## 2025-03-07 NOTE — PROGRESS NOTES
Knee New Office Note    Assessment:     1. Primary osteoarthritis of right knee        Plan:  Assessment & Plan  Primary osteoarthritis of right knee  Findings today are consistent with right knee osteoarthritis, worst in the patellofemoral compartment. Imaging and prognosis was reviewed with the patient today. Discussed treatment options including continued observation, low impact exercises, bracing, anti-inflammatories, physical therapy, cortisone injection, visco injection, versus surgical intervention. Aspiration and cortisone steroid injection was administered to the right knee today. Patient should avoid strenuous activities for the next 1-2 days. Patient should avoid vaccines for the next 2 weeks if possible. If patient is diabetic, should monitor glucose levels for the next 7 to 10 days. Can apply cold compress for soreness. If patient feels relief with the cortisone injections, procedure can be repeated every 3 months. Follow up in 3 months.    Orders:    Large joint arthrocentesis: R knee    Subjective:     Patient ID: Danuta Hutton is a 64 y.o. female.  Chief Complaint:  HPI:  64 y.o. female presents to the office for follow up of right knee osteoarthritis. She had worsening right knee swelling in December 2024 when moving her sons. She has been experiencing tightness and discomfort to the right knee due to the swelling. She has difficulty with stairs. She has had some gradual improvement with taking meloxicam.     Allergy:  Allergies   Allergen Reactions    Zithromax [Azithromycin]      Other reaction(s): GI Upset  Category: Allergy;     Clindamycin Dizziness and Fatigue    Codeine Drowsiness     Medications:  all current active meds have been reviewed  Past Medical History:  Past Medical History:   Diagnosis Date    Anxiety     Hyperlipidemia     Hypertension     Microscopic hematuria     Subclinical hypothyroidism      Past Surgical History:  Past Surgical History:   Procedure Laterality Date     CATARACT EXTRACTION Right      SECTION      COLONOSCOPY  2023    7 year recall. Due 2030    TONSILLECTOMY      TOTAL ABDOMINAL HYSTERECTOMY  2010    w/left ovary removal. Last assessed: 16     Family History:  Family History   Problem Relation Age of Onset    Arthritis Mother     Heart disease Father     Diabetes Father     Thyroid disease Sister     Aneurysm Sister     No Known Problems Sister     Diabetes type I Son         Mellitus    Mental illness Neg Hx     Substance Abuse Neg Hx     Alcohol abuse Neg Hx     Breast cancer Neg Hx     Ovarian cancer Neg Hx     Uterine cancer Neg Hx     Colon cancer Neg Hx      Social History:  Social History     Substance and Sexual Activity   Alcohol Use No     Social History     Substance and Sexual Activity   Drug Use No     Social History     Tobacco Use   Smoking Status Never    Passive exposure: Never   Smokeless Tobacco Never         ROS:  General: Per HPI  Skin: Negative, except if noted below  HEENT: Negative  Respiratory: Negative  Cardiovascular: Negative  Gastrointestinal: Negative  Urinary: Negative  Vascular: Negative  Musculoskeletal: Positive per HPI   Neurologic: Positive per HPI  Endocrine: Negative    Objective:  BP Readings from Last 1 Encounters:   25 142/90      Wt Readings from Last 1 Encounters:   25 74.4 kg (164 lb)        Respiratory:   non-labored respirations    Lymphatics:  no palpable lymph nodes    Gait:   antalgic     Neurologic:   Alert and oriented times 3  Patient with normal sensation except as noted below  Deep tendon reflexes 2+ except as noted in MSK exam     Bilateral Lower Extremity:  Right hip: Full ROM without pain     Right knee:     Inspection:  skin intact    Overall limb alignment neutral    Effusion: mild    ROM 0-125 wo pain    Extensor Lag: none    Palpation: peripatellar tenderness to palpation    AP Stability at 90 deg stable    M/L stability in full extension stable    M/L stability  "in midflexion stable    Motor: 5/5 Q/HS/TA/GS/P    Pulses: 2+ DP / 2+ PT    SILT DP/SP/S/S/TN    Imaging:  No new imaging obtained today    BMI:   Estimated body mass index is 27.29 kg/m² as calculated from the following:    Height as of this encounter: 5' 5\" (1.651 m).    Weight as of this encounter: 74.4 kg (164 lb).  BSA:   Estimated body surface area is 1.82 meters squared as calculated from the following:    Height as of this encounter: 5' 5\" (1.651 m).    Weight as of this encounter: 74.4 kg (164 lb).         Large joint arthrocentesis: R knee  Universal Protocol:  Consent: Verbal consent obtained.  Risks and benefits: risks, benefits and alternatives were discussed  Consent given by: patient  Time out: Immediately prior to procedure a \"time out\" was called to verify the correct patient, procedure, equipment, support staff and site/side marked as required.  Timeout called at: 3/7/2025 1:14 PM.  Patient understanding: patient states understanding of the procedure being performed  Site marked: the operative site was marked  Required items: required blood products, implants, devices, and special equipment available  Patient identity confirmed: verbally with patient  Supporting Documentation  Indications: joint swelling   Procedure Details  Location: knee - R knee  Preparation: Patient was prepped and draped in the usual sterile fashion  Needle size: 18 G  Ultrasound guidance: no  Approach: superior  Medications administered: 4 mL lidocaine 1 %; 40 mg triamcinolone acetonide 40 mg/mL    Aspirate amount: 5 mL  Aspirate: clear, serous and yellow  Patient tolerance: patient tolerated the procedure well with no immediate complications  Dressing:  Sterile dressing applied        Scribe Attestation      I,:  Mabel Mayorga am acting as a scribe while in the presence of the attending physician.:       I,:  Venu Chen DO personally performed the services described in this documentation    as scribed in my " presence.:

## 2025-03-09 ENCOUNTER — APPOINTMENT (EMERGENCY)
Dept: CT IMAGING | Facility: HOSPITAL | Age: 65
End: 2025-03-09
Payer: COMMERCIAL

## 2025-03-09 ENCOUNTER — HOSPITAL ENCOUNTER (EMERGENCY)
Facility: HOSPITAL | Age: 65
Discharge: HOME | End: 2025-03-09
Attending: EMERGENCY MEDICINE | Admitting: EMERGENCY MEDICINE
Payer: COMMERCIAL

## 2025-03-09 ENCOUNTER — APPOINTMENT (EMERGENCY)
Dept: RADIOLOGY | Facility: HOSPITAL | Age: 65
End: 2025-03-09
Payer: COMMERCIAL

## 2025-03-09 VITALS
TEMPERATURE: 97.8 F | HEIGHT: 62 IN | RESPIRATION RATE: 18 BRPM | WEIGHT: 160 LBS | SYSTOLIC BLOOD PRESSURE: 203 MMHG | DIASTOLIC BLOOD PRESSURE: 98 MMHG | HEART RATE: 97 BPM | OXYGEN SATURATION: 98 % | BODY MASS INDEX: 29.44 KG/M2

## 2025-03-09 DIAGNOSIS — R93.89 ABNORMAL CT SCAN: ICD-10-CM

## 2025-03-09 DIAGNOSIS — S01.81XA FACIAL LACERATION: Primary | ICD-10-CM

## 2025-03-09 DIAGNOSIS — S80.211A ABRASION OF RIGHT KNEE: ICD-10-CM

## 2025-03-09 DIAGNOSIS — S80.01XA CONTUSION OF RIGHT KNEE: ICD-10-CM

## 2025-03-09 DIAGNOSIS — S09.90XA HEAD INJURY: ICD-10-CM

## 2025-03-09 PROCEDURE — 99203 OFFICE O/P NEW LOW 30 MIN: CPT | Performed by: PLASTIC SURGERY

## 2025-03-09 PROCEDURE — 12051 INTMD RPR FACE/MM 2.5 CM/<: CPT

## 2025-03-09 PROCEDURE — 70486 CT MAXILLOFACIAL W/O DYE: CPT

## 2025-03-09 PROCEDURE — 99284 EMERGENCY DEPT VISIT MOD MDM: CPT

## 2025-03-09 PROCEDURE — 99285 EMERGENCY DEPT VISIT HI MDM: CPT | Performed by: PHYSICIAN ASSISTANT

## 2025-03-09 PROCEDURE — 73564 X-RAY EXAM KNEE 4 OR MORE: CPT

## 2025-03-09 RX ORDER — TRIAMCINOLONE ACETONIDE 40 MG/ML
40 INJECTION, SUSPENSION INTRA-ARTICULAR; INTRAMUSCULAR
Status: COMPLETED | OUTPATIENT
Start: 2025-03-07 | End: 2025-03-07

## 2025-03-09 RX ORDER — GINSENG 100 MG
1 CAPSULE ORAL ONCE
Status: COMPLETED | OUTPATIENT
Start: 2025-03-09 | End: 2025-03-09

## 2025-03-09 RX ORDER — CHLORHEXIDINE GLUCONATE ORAL RINSE 1.2 MG/ML
15 SOLUTION DENTAL 2 TIMES DAILY
Qty: 120 ML | Refills: 0 | Status: SHIPPED | OUTPATIENT
Start: 2025-03-09

## 2025-03-09 RX ORDER — LIDOCAINE HYDROCHLORIDE AND EPINEPHRINE 10; 10 MG/ML; UG/ML
10 INJECTION, SOLUTION INFILTRATION; PERINEURAL ONCE
Status: COMPLETED | OUTPATIENT
Start: 2025-03-09 | End: 2025-03-09

## 2025-03-09 RX ORDER — LIDOCAINE HYDROCHLORIDE 10 MG/ML
4 INJECTION, SOLUTION INFILTRATION; PERINEURAL
Status: COMPLETED | OUTPATIENT
Start: 2025-03-07 | End: 2025-03-07

## 2025-03-09 RX ADMIN — BACITRACIN 1 SMALL APPLICATION: 500 OINTMENT TOPICAL at 11:02

## 2025-03-09 RX ADMIN — LIDOCAINE HYDROCHLORIDE,EPINEPHRINE BITARTRATE 10 ML: 10; .01 INJECTION, SOLUTION INFILTRATION; PERINEURAL at 11:03

## 2025-03-09 NOTE — PROCEDURES
"Universal Protocol:  procedure performed by consultantConsent: Verbal consent obtained.  Risks and benefits: risks, benefits and alternatives were discussed  Consent given by: patient  Time out: Immediately prior to procedure a \"time out\" was called to verify the correct patient, procedure, equipment, support staff and site/side marked as required.  Patient understanding: patient states understanding of the procedure being performed  Patient consent: the patient's understanding of the procedure matches consent given  Required items: required blood products, implants, devices, and special equipment available  Patient identity confirmed: verbally with patient  Laceration repair    Date/Time: 3/9/2025 1:11 PM    Performed by: Sduha Montilla PA-C  Authorized by: Sudha Montilla PA-C  Body area: head/neck  Location details: right cheek  Laceration length: 2 cm  Foreign bodies: no foreign bodies  Tendon involvement: none  Nerve involvement: none  Vascular damage: no  Anesthesia: local infiltration    Anesthesia:  Local Anesthetic: lidocaine 1% with epinephrine  Anesthetic total: 3 mL    Sedation:  Patient sedated: no      Wound Dehiscence:    Secondary closure or dehiscence: complex    Procedure Details:  Preparation: Patient was prepped and draped in the usual sterile fashion.  Irrigation solution: saline  Irrigation method: syringe  Amount of cleaning: extensive  Debridement: minimal  Degree of undermining: none  Skin closure: 4-0 Prolene  Mucous membrane closure: 3-0 Chromic gut  Wound subcutaneous closure material used: 4-0 monocryl.  Number of sutures: 6  Technique: simple  Approximation: close  Approximation difficulty: complex  Dressing: antibiotic ointment  Patient tolerance: patient tolerated the procedure well with no immediate complications      Sudha Montilla PA-C  Plastic and Reconstructive Surgery   "

## 2025-03-09 NOTE — ED PROVIDER NOTES
Time reflects when diagnosis was documented in both MDM as applicable and the Disposition within this note       Time User Action Codes Description Comment    3/9/2025 10:51 AM Nola Sarmiento Add [S01.81XA] Facial laceration     3/9/2025 11:38 AM Nola Sarmiento Add [S09.90XA] Head injury     3/9/2025 11:38 AM Nola Sarmiento Add [R93.89] Abnormal CT scan     3/9/2025 11:39 AM Nola Sarmiento Modify [R93.89] Abnormal CT scan  Incidentally noted partially imaged soft tissue density/lesion in the right neck anterior to the thyroid cartilage. Recommend further assessment with contrast-enhanced neck CT. 3.  6 mm subcutaneous lesion over the anterior inferior right mandible. Correlate with direct clinical assessment.    3/9/2025  1:46 PM Nola Sarmiento Add [S80.211A] Abrasion of right knee     3/9/2025  1:47 PM Nola Sarmiento Add [S80.01XA] Contusion of right knee           ED Disposition       ED Disposition   Discharge    Condition   Stable    Date/Time   Sun Mar 9, 2025  1:44 PM    Comment   Danuta Hutton discharge to home/self care.                   Assessment & Plan       Medical Decision Making  Patient with laceration to face after a fall, will consult plastics since it is a through and through laceration.  WIll order CT facial bones to r/o fracture.  Will order knee xray to r/o fracture.  Patient sutured by plastics, tetanus is UTD, will start on augmentin and mouthwash.  Patient with density in neck, instructed patient to f/u with PCP for further evaluation of neck.   INstructed patient to f/u with ortho if knee pain continues.   No headache or LOC, no concern for hemorrhage.  Patient given head injury instructions with return precautions.     Amount and/or Complexity of Data Reviewed  Radiology: ordered and independent interpretation performed.    Risk  OTC drugs.  Prescription drug management.        ED Course as of 03/09/25 1410   Sun Mar 09, 2025   1104 Spoke with Sudha Montilla  from plastics, she will be in to suture patient in the ER.    1139 Sudha from plastic surgery currently in room with patient.    1330 Patient sutured by surgical PA, she would like patient monitored for another 20 minutes.    1403 No active bleeding, will d/c patient.        Medications   lidocaine-epinephrine (XYLOCAINE/EPINEPHRINE) 1 %-1:100,000 injection 10 mL (10 mL Infiltration Given by Other 3/9/25 1103)   bacitracin topical ointment 1 small application (1 small application Topical Given by Other 3/9/25 1102)       ED Risk Strat Scores                                                History of Present Illness       Chief Complaint   Patient presents with    Fall     Pt states that she tripped on the floor molding. Pt states that this happened 20mins ago. Pt states that she landed face first on the right side of face. Pt also landed on right knee which she just had drained on Friday. Pt denies LOC, pt denies taking a thinner       Past Medical History:   Diagnosis Date    Anxiety     Hyperlipidemia     Hypertension     Microscopic hematuria     Subclinical hypothyroidism       Past Surgical History:   Procedure Laterality Date    CATARACT EXTRACTION Right      SECTION      COLONOSCOPY  2023    7 year recall. Due 2030    TONSILLECTOMY      TOTAL ABDOMINAL HYSTERECTOMY  2010    w/left ovary removal. Last assessed: 16      Family History   Problem Relation Age of Onset    Arthritis Mother     Heart disease Father     Diabetes Father     Thyroid disease Sister     Aneurysm Sister     No Known Problems Sister     Diabetes type I Son         Mellitus    Mental illness Neg Hx     Substance Abuse Neg Hx     Alcohol abuse Neg Hx     Breast cancer Neg Hx     Ovarian cancer Neg Hx     Uterine cancer Neg Hx     Colon cancer Neg Hx       Social History     Tobacco Use    Smoking status: Never     Passive exposure: Never    Smokeless tobacco: Never   Vaping Use    Vaping status: Never Used    Substance Use Topics    Alcohol use: No    Drug use: No      E-Cigarette/Vaping    E-Cigarette Use Never User       E-Cigarette/Vaping Substances    Nicotine No     THC No     CBD No     Flavoring No       I have reviewed and agree with the history as documented.     Patient is a 65 y/o F with h/o HTN, hyperlipidemia that presents to the ED after a fall.  She states she tripped over molding in her room and fell forward onto her knees and hit her face on the molding.  She denies LOC.  SHe is alert and oriented x 3.  No anticoagulants.  She denies headache, dizziness or nausea.  She has a 2.5cm through and through laceration to right cheek.  She has an abrasion to right knee with pain and swelling.  She states she just had her right knee aspirated last week by ortho.  Last tetanus was in 2021.  NO numbness or weakness.        History provided by:  Patient  Fall  Associated symptoms: no headaches, no nausea and no vomiting        Review of Systems   Constitutional:  Negative for chills and fever.   Eyes:  Negative for visual disturbance.   Gastrointestinal:  Negative for nausea and vomiting.   Musculoskeletal:         Right knee pain   Skin:  Positive for wound.   Neurological:  Negative for dizziness, weakness and headaches.   Psychiatric/Behavioral:  Negative for confusion.    All other systems reviewed and are negative.          Objective       ED Triage Vitals [03/09/25 0950]   Temperature Pulse Blood Pressure Respirations SpO2 Patient Position - Orthostatic VS   97.8 °F (36.6 °C) 97 (!) 203/98 18 98 % Sitting      Temp Source Heart Rate Source BP Location FiO2 (%) Pain Score    Oral Monitor Left arm -- --      Vitals      Date and Time Temp Pulse SpO2 Resp BP Pain Score FACES Pain Rating User   03/09/25 0950 97.8 °F (36.6 °C) 97 98 % 18 203/98 -- -- LD            Physical Exam  Vitals and nursing note reviewed.   Constitutional:       General: She is not in acute distress.     Appearance: Normal appearance. She  is well-developed and well-groomed. She is not ill-appearing or diaphoretic.   HENT:      Head: Normocephalic.      Comments: Patient has 2.5 cm through and through laceration to right cheek, bleeding controlled with pressure.      Right Ear: Hearing and tympanic membrane normal.      Left Ear: Hearing and tympanic membrane normal.      Nose: Nose normal. No nasal tenderness.      Mouth/Throat:      Mouth: Mucous membranes are moist.      Dentition: Normal dentition.      Pharynx: Oropharynx is clear.   Eyes:      General: Lids are normal.      Extraocular Movements: Extraocular movements intact.      Conjunctiva/sclera: Conjunctivae normal.      Pupils: Pupils are equal, round, and reactive to light.   Cardiovascular:      Rate and Rhythm: Normal rate and regular rhythm.      Heart sounds: Normal heart sounds.   Pulmonary:      Effort: Pulmonary effort is normal.      Breath sounds: Normal breath sounds.   Chest:      Chest wall: No tenderness.   Musculoskeletal:      Cervical back: Normal range of motion. No spinous process tenderness or muscular tenderness.      Right hip: Normal.      Right upper leg: Normal.      Right knee: Swelling and bony tenderness present. No deformity or erythema. Normal range of motion.      Right lower leg: Normal.      Right ankle: Normal.      Right foot: Normal.      Comments: B/L UE and LLE FROM, nontender to palpation.    Skin:     General: Skin is warm and dry.      Coloration: Skin is not pale.      Findings: Abrasion (right knee) and laceration (right cheek) present.   Neurological:      Mental Status: She is alert and oriented to person, place, and time.      GCS: GCS eye subscore is 4. GCS verbal subscore is 5. GCS motor subscore is 6.      Cranial Nerves: Cranial nerves 2-12 are intact.      Sensory: Sensation is intact.      Motor: Motor function is intact.   Psychiatric:         Behavior: Behavior is cooperative.         Results Reviewed       None            XR knee 4+  views Right injury   ED Interpretation by Nola Sarmiento PA-C (03/09 1030)   No acute fracture.  No changes from prior xray.       Final Interpretation by Chidi Lucia MD (03/09 1123)      No acute osseous abnormality.      Degenerative changes as described.         Computerized Assisted Algorithm (CAA) may have been used to analyze all applicable images.         Resident: Sanford Dash I, the attending radiologist, have reviewed the images and agree with the final report above.      Workstation performed: [id number]         CT facial bones without contrast   Final Interpretation by Damián Edouard MD (03/09 1135)   Addendum (preliminary) 1 of 1 by Damián Edouard MD (03/09 1135)   ADDENDUM:      Deep soft tissue laceration overlying the right maxilla close to the    lateral right upper lip is somewhat obscured by streak artifact from prior    dental work, but no obvious radiodense foreign body is identified.      Final      1.  No acute osseous abnormality.   2.  Incidentally noted partially imaged soft tissue density/lesion in the right neck anterior to the thyroid cartilage. Recommend further assessment with contrast-enhanced neck CT.   3.  6 mm subcutaneous lesion over the anterior inferior right mandible. Correlate with direct clinical assessment.            This study was marked in EPIC for notification and follow-up.               Resident: Enoch Ramirez I, the attending radiologist, have reviewed the images and agree with the final report above.      Workstation performed: [id number]             Procedures    ED Medication and Procedure Management   Prior to Admission Medications   Prescriptions Last Dose Informant Patient Reported? Taking?   ketoconazole (NIZORAL) 2 % cream   No No   Sig: Apply topically 2 (two) times a day   losartan (COZAAR) 25 mg tablet   No No   Sig: take 1 tablet by mouth twice a day   rosuvastatin (CRESTOR) 5 mg tablet   No No   Sig: take 1 tablet by mouth once  daily AFTER DINNER      Facility-Administered Medications: None     Discharge Medication List as of 3/9/2025  1:47 PM        START taking these medications    Details   amoxicillin-clavulanate (AUGMENTIN) 875-125 mg per tablet Take 1 tablet by mouth every 12 (twelve) hours for 7 days, Starting Sun 3/9/2025, Until Sun 3/16/2025, Normal      chlorhexidine (PERIDEX) 0.12 % solution Apply 15 mL to the mouth or throat 2 (two) times a day, Starting Sun 3/9/2025, Normal           CONTINUE these medications which have NOT CHANGED    Details   ketoconazole (NIZORAL) 2 % cream Apply topically 2 (two) times a day, Starting Wed 8/2/2023, Normal      losartan (COZAAR) 25 mg tablet take 1 tablet by mouth twice a day, Starting Fri 2/7/2025, Normal      rosuvastatin (CRESTOR) 5 mg tablet take 1 tablet by mouth once daily AFTER DINNER, Normal           No discharge procedures on file.  ED SEPSIS DOCUMENTATION   Time reflects when diagnosis was documented in both MDM as applicable and the Disposition within this note       Time User Action Codes Description Comment    3/9/2025 10:51 AM Nola Sarmiento [S01.81XA] Facial laceration     3/9/2025 11:38 AM Nola Sarmiento [S09.90XA] Head injury     3/9/2025 11:38 AM Nola Sarmiento Add [R93.89] Abnormal CT scan     3/9/2025 11:39 AM Nola Sarmiento Modify [R93.89] Abnormal CT scan  Incidentally noted partially imaged soft tissue density/lesion in the right neck anterior to the thyroid cartilage. Recommend further assessment with contrast-enhanced neck CT. 3.  6 mm subcutaneous lesion over the anterior inferior right mandible. Correlate with direct clinical assessment.    3/9/2025  1:46 PM Nola Sarmiento [S80.211A] Abrasion of right knee     3/9/2025  1:47 PM Nola Sarmiento [S80.01XA] Contusion of right knee                  Nola Sarmiento PA-C  03/09/25 1410

## 2025-03-09 NOTE — CONSULTS
Consultation - Plastic Surgery   Name: Danuta Hutton 64 y.o. female I MRN: [id number]  Unit/Bed#: ED 01 I Date of Admission: 3/9/2025   Date of Service: 3/9/2025 I Hospital Day: 0   Consults  Physician Requesting Evaluation: Ryan Colvin DO   Reason for Evaluation / Principal Problem: Right cheek facial wound    Assessment & Plan    Right cheek facial wound  -Laceration irrigated and repaired at bedside. Patient tolerated well. Follow up in office for suture removal for Friday, 3/14/25. Augmentin PO. Bacitracin to the area twice daily. Chlorhexidine mouth wash BID. Soft foods and liquids for 2-3 days. Ice as needed. Call the office with any concerns [phone].    History of Present Illness   Danuta Hutton is a 64 y.o. female who presents with right cheek facial wound. Patient fell at home and hit her face on molding. She was transported via EMS. UTD on vaccines. Denies use of blood thinning medications. CT facial bones negative.     Review of Systems  Historical Information   Medical History Review: I have reviewed the patient's PMH, PSH, Social History, Family History, Meds, and Allergies     Objective :  Temp:  [97.8 °F (36.6 °C)] 97.8 °F (36.6 °C)  HR:  [97] 97  BP: (203)/(98) 203/98  Resp:  [18] 18  SpO2:  [98 %] 98 %  O2 Device: None (Room air)    Physical Exam  General: AAOx3, cooperative, pleasant.  Face: Right cheek laceration extending through the oral cavity. 2 x 0.5 x 0.5cm. Clean, tissue edges appear healthy. See media      Study Result    Narrative & Impression   CT FACIAL BONES WITHOUT INTRAVENOUS CONTRAST     INDICATION:   fall.     COMPARISON: CT sinuses 1/16/2006     TECHNIQUE:  Axial CT images were obtained through the facial bones with additional sagittal and coronal reconstructions.     Radiation dose length product (DLP) for this visit:  404.93 mGy-cm .  This examination, like all CT scans performed in the [facility], was performed utilizing techniques to minimize  radiation dose exposure, including the use of iterative   reconstruction and automated exposure control.     IMAGE QUALITY:  Diagnostic.     FINDINGS:     FACIAL BONES:  No facial bone fracture identified. Normal alignment of the temporomandibular joints. Severe osteoarthritis of bilateral temporomandibular joints. No lytic or blastic lesion.     Midline clival lucency measuring 0.6 cm is unchanged from 2006 and therefore benign (series 3 image 69).     ORBITS: Right lens replacement. Orbital globes, optic nerves, and extraocular muscles appear symmetric and normal. There is no evidence of retrobulbar mass, abscess, or hematoma.     SINUSES: Mild bilateral maxillary sinus mucosal thickening.     SOFT TISSUES:     Incidentally noted partially imaged soft tissue density in the right neck anterior to the thyroid cartilage seen on series 2 image 2     6 mm subcutaneous lesion over the anterior inferior right mandible seen on series 2 image 6.     OTHER FINDINGS: Partially imaged benign hemangioma involving the entire C4 vertebral body.     IMPRESSION:     1.  No acute osseous abnormality.  2.  Incidentally noted partially imaged soft tissue density/lesion in the right neck anterior to the thyroid cartilage. Recommend further assessment with contrast-enhanced neck CT.  3.  6 mm subcutaneous lesion over the anterior inferior right mandible. Correlate with direct clinical assessment.            VTE Prophylaxis: VTE covered by:    None     Sudha Montilla PA-C  Plastic and Reconstructive Surgery

## 2025-03-09 NOTE — ED NOTES
Pt discharged by ED provider before RN was able to obtain discharge vitals.      Stacy Gaviria RN  03/09/25 2087

## 2025-03-09 NOTE — DISCHARGE INSTRUCTIONS
Take antibiotic twice a day and rinse mouth twice a day.  Tylenol for discomfort.  Follow up with plastic surgeon in 5 days for recheck.  Follow up with PCP for further evaluation of soft tissue density in neck.   Return to ER if symptoms worsen.   Follow up with ortho as needed for knee pain.

## 2025-03-10 ENCOUNTER — TELEPHONE (OUTPATIENT)
Dept: ADMINISTRATIVE | Facility: OTHER | Age: 65
End: 2025-03-10

## 2025-03-10 NOTE — TELEPHONE ENCOUNTER
03/10/25 12:14 PM    Patient contacted post ED visit, VBI phone outreaches documented. Patient called practice and scheduled a follow-up ED visit.     Thank you.  Chanel Rasheed MA  [facility]       previous  lumbar spine surgeries. These include a lumbar spine decompression  followed by lumbar spine instrumentation and fusion. He then underwent two  separate operations for hardware removal, first one side and then the other  side of lumbar spine, until hardware removed following surgery. The  patient went on to show continued pain, symptoms of leg pain as well. He  is having difficulty walking and has evidence of lumbar spinal stenosis,  which remains symptomatic and giving him difficulty with mobility. He  finds his back is painful as are his legs. He has trouble walking as a  result of this. He is now in preference of pursuing definitive management  for the relief of the stenosis. I discussed with the patient my diagnosis,  which is lumbar spinal stenosis and he shows evidence of this both in the  recesses and foramina of levels of L3, L4, L5, and S1. Due to the fact  that he has been through previous surgery 4 times previously, hardwares taken  out, this would ultimately require revision decompression. He also  underwent an L4-L5 left side annulotomy and hemilaminectomy for the  exposure of the L4-L5 space for interbody fusion. This was also a part of  his previous operation at L4-L5. He understands the risks and benefits of  the operation, postop care as well and the nature of the operation as it is  a revision. He does have a risk for infection, durotomy, bleeding, and  postop complications of this nature. DESCRIPTION:  We brought the patient to the operating room and upon  entrance, time-out was observed. Anesthetic was delivered. Airway  secured. Fry catheter would also be placed. He would then be turned  prone to a Kedar frame table, well padded and appropriately positioned to  be then prepped and draped with use of a soap scrub solution, sterile  toweling, and ChloraPrep solution.   Sterile sheeting was applied as well as  Ioban to the midline and the skin marked midline over levels of L2 through  S1.  I injected 10 mL of 1% lidocaine with epinephrine. Skin was then  incised. Hemostasis maintained. Bony elements exposed in the midline  across the levels of L3 through S1. We clamped the L3 spinous process. X-rays were taken and level of operation would be confirmed with one of  these x-rays. With the exposure of transverse processes and laminar bone  as well as the ala of S1 bilateral from L3 through S1, we then set the  retractors and then began the decompression. We performed removal of  spinous process bone, laminar bone, ligamentum flavum where neuro  compressive across levels at L3, L4, L5 bilateral.  At L5-S1, we also  decompressed this level at the left hand side with the right hand side  L5-S1 annulotomy. A very thorough relief of stenosis could be obtained  with relief of all areas of neuro compression within the recess, canal, and  foramina bilateral with use of the curette, Kerrison rongeur and the  osteotomes. There was significant adhesion of the left hand side L4 level  due to previous laminectomy and interbody fusion through this posterior  lumbar interbody technique. This required a wider decompression of the  L4-L5 level as well for a complete relief of the stenosis at this level. We traced out the nerve root structures of L3, L4, L5 bilaterally through  the opening foramina were neuro compressed as we decompressed these areas  very thoroughly and removed the superior process facets of levels of S1,  L5, and L4 where neuro compressive through the L5-S1, L4-L5, and L3-L4  neuroforamina bilaterally and respectively. Once all of this work was  performed, we then exposed the L5-S1 level interbody space through our  right hand side annulotomy and gently retracted the traversing right S1  nerve root from midline. As we carried this out, there was no evidence of  durotomy.   A careful exposure was performed with gentle retraction of the  traversing right S1 nerve root toward midline. We maintained hemostasis as  this was performed and removed disc and cartilage from this interbody  surface ultimately to be able to prepare this interbody surface for a  fusion and trialing all these with 12 x 22 mm interbody cage trial.  One of  these cages was opened to the field sterilely and this was the [employer]  PEEK Capstone cage with titanium coding, which will be filled local bone  grafting and Infuse BMP to be tamped into place through a right hand side  L5-S1 annulotomy. As we tamped this cage into place, it sat very well  within this interbody surface of L5-S1 and ultimately was well-seated. There was no evidence of excessive bleeding in the course of this insertion  or nerve injury. We then set aside retractor to expose the bony structures  posteriorly across levels of L3 through S1 bilateral.  With the use of the  bur, the [facility] elevator and the gearshift probe, each pedicle would then  be prepared and then tapped, ultimately having pedicle screws inserted  throughout with the bicortical process across S1. We then tested all  thresholds that gave a measure better than 6 milliamps throughout with the  exception of the left level of L3 that measured 6, although those were  better than 13 milliamps throughout. There was a thread across the L3  level, which was not neuro compressive. We chose two of the 90 mm cobalt  chrome rods using one of these per side and 4 breakup screw caps as well. I would also engage these screw heads at L5-S1 bilateral to engage this  interbody cage of L5-S1. Once this was completed, we were satisfied with  our cage position and its insertion as well as all hardware. We irrigated  very thoroughly. All counts were correct. The Infuse BMP and local  grafting will be applied over the posterior margin of transverse process of  levels of L3, L4, L5-S1 bilateral.  All graft was placed from interval  position.   We removed the retractors and closed over 2 drains and doing  intraoperative x-rays that showed well-placed interbody cage fixation of  L5-S1 with the previously placed cage across L4-L5 and bilateral pedicle  screw fixation spanning levels of L3 through S1 bilateral.  We then closed  in layers over the drains and took the patient back to recovery  post-extubation without complication or difficulty and my first assistant  was also [employer] ALIX Velazco as well. Jovana Story M.D. Jose Allen PA-C, assisted throughout the procedure with positioning,  draping, retraction, wound closure, dressing, and splint application.     D: 12/06/2017 14:12:32       T: 12/06/2017 18:48:51     [id number]  Job#: [id number]     Doc#: [id number]    CC:

## 2025-03-14 ENCOUNTER — OFFICE VISIT (OUTPATIENT)
Dept: PLASTIC SURGERY | Facility: CLINIC | Age: 65
End: 2025-03-14

## 2025-03-14 DIAGNOSIS — S01.411A: Primary | ICD-10-CM

## 2025-03-14 PROCEDURE — 99024 POSTOP FOLLOW-UP VISIT: CPT

## 2025-03-18 NOTE — PROGRESS NOTES
[facility] Plastic and Reconstructive Surgery  [address], [address], [place] [address]  [phone]    Patient Identification: Danuta Hutton is a 64 y.o. female     History of Present Illness: The patient is a 64 y.o.  year-old female  who presents to the office for a follow up visit. Patient suffered a full thickness laceration to the right cheek on 3/9/2025 when she fell at her home. She was treated at the  ED. At that time plastic surgery repaired the defect.  Patient is taking antibiotics as instructed, using peridex mouthwash twice daily, and taking tylenol as needed. She is eating soft foods and liquids. Denies fevers, chills, signs of infection or significant pain. She has no concerns at this time.      Past Medical History:   Diagnosis Date    Anxiety     Hyperlipidemia     Hypertension     Microscopic hematuria     Subclinical hypothyroidism           Review of Systems  Constitutional: Denies fevers, chills or pain.  Skin: Denies any warmth, erythema, edema or mucopurulent drainage. Laceration right cheek    Physical Exam    Right Cheek: Laceration well approximated, sutures in place, removed. Expected edema and bruising to surrounding tissue. No signs of infection. No fluctuance, active drainage or bleeding. Intraoral wound healing well, sutures dissolved.    Assessment and Plan:  The patient is an 64 y.o.  year-old female who presents to the office for a follow up visit s/p laceration repair in the ED 3/9/2025.    -At today's visit sutures removed, healing well without concerns. May apply bacitracin or aquaphor to the area daily. Discussed scar creams and scar massage, may begin at 3 weeks from injury.  -Finish antibiotic as instructed. Continue to use peridex wash for 1 week. Continue to use caution with eating/drinking hot or cold liquids.   -The patient is to return in 2 weeks for scar check.  -The patient is to call the office with any questions or concerns. All of the patient's  questions were answered at this time and they agree with the plan of care.      Sudha Montilla PA-C  [facility] Plastic and Reconstructive Surgery

## 2025-03-23 LAB
ALBUMIN SERPL-MCNC: 4.3 G/DL (ref 3.6–5.1)
ALBUMIN/GLOB SERPL: 1.7 (CALC) (ref 1–2.5)
ALP SERPL-CCNC: 61 U/L (ref 37–153)
ALT SERPL-CCNC: 14 U/L (ref 6–29)
AST SERPL-CCNC: 13 U/L (ref 10–35)
BILIRUB SERPL-MCNC: 0.6 MG/DL (ref 0.2–1.2)
BUN SERPL-MCNC: 15 MG/DL (ref 7–25)
BUN/CREAT SERPL: ABNORMAL (CALC) (ref 6–22)
CALCIUM SERPL-MCNC: 9.2 MG/DL (ref 8.6–10.4)
CHLORIDE SERPL-SCNC: 103 MMOL/L (ref 98–110)
CHOLEST SERPL-MCNC: 154 MG/DL
CHOLEST/HDLC SERPL: 3.7 (CALC)
CO2 SERPL-SCNC: 31 MMOL/L (ref 20–32)
CREAT SERPL-MCNC: 0.65 MG/DL (ref 0.5–1.05)
GFR/BSA.PRED SERPLBLD CYS-BASED-ARV: 99 ML/MIN/1.73M2
GLOBULIN SER CALC-MCNC: 2.6 G/DL (CALC) (ref 1.9–3.7)
GLUCOSE SERPL-MCNC: 100 MG/DL (ref 65–99)
HDLC SERPL-MCNC: 42 MG/DL
LDLC SERPL CALC-MCNC: 91 MG/DL (CALC)
NONHDLC SERPL-MCNC: 112 MG/DL (CALC)
POTASSIUM SERPL-SCNC: 4.1 MMOL/L (ref 3.5–5.3)
PROT SERPL-MCNC: 6.9 G/DL (ref 6.1–8.1)
SODIUM SERPL-SCNC: 140 MMOL/L (ref 135–146)
TRIGL SERPL-MCNC: 117 MG/DL
TSH SERPL-ACNC: 3.71 MIU/L (ref 0.4–4.5)

## 2025-03-24 ENCOUNTER — RESULTS FOLLOW-UP (OUTPATIENT)
Dept: FAMILY MEDICINE CLINIC | Facility: CLINIC | Age: 65
End: 2025-03-24

## 2025-03-26 DIAGNOSIS — R22.1 NECK MASS: Primary | ICD-10-CM

## 2025-03-28 DIAGNOSIS — E78.5 HYPERLIPIDEMIA, UNSPECIFIED HYPERLIPIDEMIA TYPE: ICD-10-CM

## 2025-03-28 RX ORDER — ROSUVASTATIN CALCIUM 5 MG/1
TABLET, COATED ORAL
Qty: 90 TABLET | Refills: 1 | Status: SHIPPED | OUTPATIENT
Start: 2025-03-28

## 2025-04-01 ENCOUNTER — OFFICE VISIT (OUTPATIENT)
Dept: PLASTIC SURGERY | Facility: CLINIC | Age: 65
End: 2025-04-01
Payer: COMMERCIAL

## 2025-04-01 DIAGNOSIS — S01.411A: Primary | ICD-10-CM

## 2025-04-01 PROCEDURE — 99212 OFFICE O/P EST SF 10 MIN: CPT

## 2025-04-01 NOTE — PROGRESS NOTES
[facility] Plastic and Reconstructive Surgery  [address], [address], [place] [address]  [phone]    Patient Identification: Danuta Hutton is a 64 y.o. female     History of Present Illness: The patient is a 64 y.o.  year-old female  who presents to the office for a follow up visit. Patient suffered a full thickness laceration to the right cheek on 3/9/2025 when she fell at her home. She was treated at the  ED. At that time plastic surgery repaired the defect.  Patient is doing well at this time. Is applying moisturizer daily. Completed antibiotics. Denies fevers, chills, signs of infection or significant pain. She has no concerns at this time.      Past Medical History:   Diagnosis Date    Anxiety     Hyperlipidemia     Hypertension     Microscopic hematuria     Subclinical hypothyroidism           Review of Systems  Constitutional: Denies fevers, chills or pain.  Skin: Denies any warmth, erythema, edema or mucopurulent drainage. Laceration right cheek    Physical Exam    Right Cheek: Laceration healing well. Minimal edema present. No signs of infection. No fluctuance, active drainage or bleeding. Intraoral wound healing well, sutures dissolved. Scar tissue forming.    Assessment and Plan:  The patient is an 64 y.o.  year-old female who presents to the office for a follow up visit s/p laceration repair in the ED 3/9/2025.    -At today's visit laceration evaluated, healing well. Scar tissue beginning to form.  -Discussed daily scar massage and use of silicone scar gel/tape to promote scar softening and flattening. The patient was educated on scar care and that it can take up to 1 year for full scar maturation. Discussed use of sunscreen when outdoors.  -Patient may return for a 6 month scar check or PRN.  -The patient is to call the office with any questions or concerns. All of the patient's questions were answered at this time and they agree with the plan of care.    I have spent a total time of  15 minutes in caring for this patient on the day of the visit/encounter including Instructions for management, Importance of tx compliance, and Impressions.    Sudha Montilla PA-C  [facility] Plastic and Reconstructive Surgery

## 2025-04-03 ENCOUNTER — HOSPITAL ENCOUNTER (OUTPATIENT)
Dept: CT IMAGING | Facility: HOSPITAL | Age: 65
Discharge: HOME | End: 2025-04-03
Payer: COMMERCIAL

## 2025-04-03 DIAGNOSIS — R22.1 NECK MASS: ICD-10-CM

## 2025-04-03 PROCEDURE — 70491 CT SOFT TISSUE NECK W/DYE: CPT

## 2025-04-03 RX ADMIN — IOHEXOL 85 ML: 350 INJECTION, SOLUTION INTRAVENOUS at 15:11

## 2025-04-07 ENCOUNTER — RESULTS FOLLOW-UP (OUTPATIENT)
Dept: FAMILY MEDICINE CLINIC | Facility: CLINIC | Age: 65
End: 2025-04-07

## 2025-04-07 DIAGNOSIS — R22.1 NECK MASS: Primary | ICD-10-CM

## 2025-06-06 ENCOUNTER — OFFICE VISIT (OUTPATIENT)
Dept: OBGYN CLINIC | Facility: CLINIC | Age: 65
End: 2025-06-06
Payer: COMMERCIAL

## 2025-06-06 VITALS — HEIGHT: 65 IN | BODY MASS INDEX: 26.33 KG/M2 | WEIGHT: 158 LBS

## 2025-06-06 DIAGNOSIS — M17.11 PRIMARY OSTEOARTHRITIS OF RIGHT KNEE: Primary | ICD-10-CM

## 2025-06-06 PROCEDURE — 20610 DRAIN/INJ JOINT/BURSA W/O US: CPT | Performed by: STUDENT IN AN ORGANIZED HEALTH CARE EDUCATION/TRAINING PROGRAM

## 2025-06-06 PROCEDURE — 99213 OFFICE O/P EST LOW 20 MIN: CPT | Performed by: STUDENT IN AN ORGANIZED HEALTH CARE EDUCATION/TRAINING PROGRAM

## 2025-06-06 RX ORDER — LIDOCAINE HYDROCHLORIDE 10 MG/ML
4 INJECTION, SOLUTION INFILTRATION; PERINEURAL
Status: COMPLETED | OUTPATIENT
Start: 2025-06-06 | End: 2025-06-06

## 2025-06-06 RX ORDER — TRIAMCINOLONE ACETONIDE 40 MG/ML
40 INJECTION, SUSPENSION INTRA-ARTICULAR; INTRAMUSCULAR
Status: COMPLETED | OUTPATIENT
Start: 2025-06-06 | End: 2025-06-06

## 2025-06-06 RX ADMIN — TRIAMCINOLONE ACETONIDE 40 MG: 40 INJECTION, SUSPENSION INTRA-ARTICULAR; INTRAMUSCULAR at 08:45

## 2025-06-06 RX ADMIN — LIDOCAINE HYDROCHLORIDE 4 ML: 10 INJECTION, SOLUTION INFILTRATION; PERINEURAL at 08:45

## 2025-06-06 NOTE — ASSESSMENT & PLAN NOTE
Findings today are consistent with right knee osteoarthritis, worst in the patellofemoral compartment. Discussed treatment options including continued observation, low impact exercises, bracing, anti-inflammatories, physical therapy, cortisone injection, visco injection, versus surgical intervention. Aspiration and cortisone steroid injection was administered today. Patient should avoid strenuous activities for the next 1-2 days. Patient should avoid vaccines for the next 2 weeks if possible. If patient is diabetic, should monitor glucose levels for the next 7 to 10 days. Can apply cold compress for soreness. Follow up at the end of August prior to moving to [place] for consideration of repeat cortisone injection.    Orders:  •  Large joint arthrocentesis: R knee  •  lidocaine (XYLOCAINE) 1 % injection 4 mL  •  triamcinolone acetonide (Kenalog-40) 40 mg/mL injection 40 mg

## 2025-06-06 NOTE — PROGRESS NOTES
Knee New Office Note    Assessment:     1. Primary osteoarthritis of right knee        Plan:  Assessment & Plan  Primary osteoarthritis of right knee  Findings today are consistent with right knee osteoarthritis, worst in the patellofemoral compartment. Discussed treatment options including continued observation, low impact exercises, bracing, anti-inflammatories, physical therapy, cortisone injection, visco injection, versus surgical intervention. Aspiration and cortisone steroid injection was administered today. Patient should avoid strenuous activities for the next 1-2 days. Patient should avoid vaccines for the next 2 weeks if possible. If patient is diabetic, should monitor glucose levels for the next 7 to 10 days. Can apply cold compress for soreness. Follow up at the end of August prior to moving to [place] for consideration of repeat cortisone injection.    Orders:  •  Large joint arthrocentesis: R knee  •  lidocaine (XYLOCAINE) 1 % injection 4 mL  •  triamcinolone acetonide (Kenalog-40) 40 mg/mL injection 40 mg    Subjective:     Patient ID: Danuta Hutton is a 64 y.o. female.  Chief Complaint:  HPI:  64 y.o. female presents to the office for follow up of right knee osteoarthritis. At last visit she received an aspiration and cortisone injection. She noted immediate relief, but had a fall 2 days following causing her swelling and pain to return. She has been experiencing tightness and discomfort to the right knee due to the swelling. She has difficulty with stairs. She has had some improvement with taking meloxicam. She is interested in repeating the aspiration and cortisone injection today.    Allergy:  Allergies[1]  Medications:  all current active meds have been reviewed  Past Medical History:  Past Medical History[2]  Past Surgical History:  Past Surgical History[3]  Family History:  Family History[4]  Social History:  Social History     Substance and Sexual Activity   Alcohol Use No     Social History  "    Substance and Sexual Activity   Drug Use No     Tobacco Use History[5]        ROS:  General: Per HPI  Skin: Negative, except if noted below  HEENT: Negative  Respiratory: Negative  Cardiovascular: Negative  Gastrointestinal: Negative  Urinary: Negative  Vascular: Negative  Musculoskeletal: Positive per HPI   Neurologic: Positive per HPI  Endocrine: Negative    Objective:  BP Readings from Last 1 Encounters:   03/09/25 (!) 203/98      Wt Readings from Last 1 Encounters:   06/06/25 71.7 kg (158 lb)        Respiratory:   non-labored respirations    Lymphatics:  no palpable lymph nodes    Gait:   antalgic     Neurologic:   Alert and oriented times 3  Patient with normal sensation except as noted below  Deep tendon reflexes 2+ except as noted in MSK exam     Bilateral Lower Extremity:  Right hip: Full ROM without pain     Right knee:     Inspection:  skin intact    Overall limb alignment neutral    Effusion: mild    ROM 0-125 wo pain    Extensor Lag: none    Palpation: peripatellar tenderness to palpation    AP Stability at 90 deg stable    M/L stability in full extension stable    M/L stability in midflexion stable    Motor: 5/5 Q/HS/TA/GS/P    Pulses: 2+ DP / 2+ PT    SILT DP/SP/S/S/TN     Imaging:  No new imaging obtained today    BMI:   Estimated body mass index is 26.29 kg/m² as calculated from the following:    Height as of this encounter: 5' 5\" (1.651 m).    Weight as of this encounter: 71.7 kg (158 lb).  BSA:   Estimated body surface area is 1.79 meters squared as calculated from the following:    Height as of this encounter: 5' 5\" (1.651 m).    Weight as of this encounter: 71.7 kg (158 lb).         Large joint arthrocentesis: R knee    Performed by: Venu Chen DO  Authorized by: Venu Chen DO    Universal Protocol:  Consent: Verbal consent obtained  Risks and benefits: risks, benefits and alternatives were discussed  Consent given by: patient  Time out: Immediately prior to procedure a " "\"time out\" was called to verify the correct patient, procedure, equipment, support staff and site/side marked as required.  Timeout called at: 2025 9:16 AM.  Patient understanding: patient states understanding of the procedure being performed  Site marked: the operative site was marked  Required items: required blood products, implants, devices, and special equipment available  Patient identity confirmed: verbally with patient  Supporting Documentation  Indications: pain     Is this a Visco injection? NoProcedure Details  Location: knee - R knee  Preparation: Patient was prepped and draped in the usual sterile fashion  Needle size: 20 G  Ultrasound guidance: no  Approach: superior  Medications administered: 4 mL lidocaine 1 %; 40 mg triamcinolone acetonide 40 mg/mL    Aspirate amount: 6 mL  Aspirate: clear  Patient tolerance: patient tolerated the procedure well with no immediate complications  Dressing:  Sterile dressing applied        Scribe Attestation    I,:  Mabel Mayorga am acting as a scribe while in the presence of the attending physician.:       I,:  Venu Chen DO personally performed the services described in this documentation    as scribed in my presence.:                     [1]  Allergies  Allergen Reactions   • Zithromax [Azithromycin]      Other reaction(s): GI Upset  Category: Allergy;    • Clindamycin Dizziness and Fatigue   • Codeine Drowsiness   [2]  Past Medical History:  Diagnosis Date   • Anxiety    • Hyperlipidemia    • Hypertension    • Microscopic hematuria    • Rheumatoid arthritis (HCC)    • Subclinical hypothyroidism    [3]  Past Surgical History:  Procedure Laterality Date   • CATARACT EXTRACTION Right    •  SECTION     • COLONOSCOPY  2023    7 year recall. Due    • TONSILLECTOMY     • TOTAL ABDOMINAL HYSTERECTOMY  2010    w/left ovary removal. Last assessed: 17   [4]  Family History  Problem Relation Name Age of Onset   • " Arthritis Mother Candace Tucker    • Rheum arthritis Mother Candace Tucker    • Heart disease Father father    • Diabetes Father father    • Thyroid disease Sister Paula    • Aneurysm Sister Paula    • No Known Problems Sister     • Diabetes type I Son          Mellitus   • Mental illness Neg Hx     • Substance Abuse Neg Hx     • Alcohol abuse Neg Hx     • Breast cancer Neg Hx     • Ovarian cancer Neg Hx     • Uterine cancer Neg Hx     • Colon cancer Neg Hx     [5]  Social History  Tobacco Use   Smoking Status Never   • Passive exposure: Never   Smokeless Tobacco Never    Toe infection